# Patient Record
Sex: FEMALE | Race: BLACK OR AFRICAN AMERICAN | NOT HISPANIC OR LATINO | Employment: OTHER | URBAN - METROPOLITAN AREA
[De-identification: names, ages, dates, MRNs, and addresses within clinical notes are randomized per-mention and may not be internally consistent; named-entity substitution may affect disease eponyms.]

---

## 2017-05-17 ENCOUNTER — HISTORICAL (OUTPATIENT)
Dept: ADMINISTRATIVE | Facility: HOSPITAL | Age: 62
End: 2017-05-17

## 2017-05-17 LAB
ABS NEUT (OLG): 3.33 X10(3)/MCL (ref 2.1–9.2)
ALBUMIN SERPL-MCNC: 3.9 GM/DL (ref 3.4–5)
ALBUMIN/GLOB SERPL: 1 RATIO (ref 1–2)
ALP SERPL-CCNC: 83 UNIT/L (ref 20–120)
ALT SERPL-CCNC: 15 UNIT/L
AST SERPL-CCNC: 20 UNIT/L
BASOPHILS # BLD AUTO: 0.05 X10(3)/MCL
BASOPHILS NFR BLD AUTO: 1 % (ref 0–1)
BILIRUB SERPL-MCNC: 0.6 MG/DL
BILIRUBIN DIRECT+TOT PNL SERPL-MCNC: <0.1 MG/DL
BILIRUBIN DIRECT+TOT PNL SERPL-MCNC: >0.5 MG/DL
BUN SERPL-MCNC: 15 MG/DL (ref 7–25)
CALCIUM SERPL-MCNC: 9.4 MG/DL (ref 8.4–10.3)
CHLORIDE SERPL-SCNC: 102 MMOL/L (ref 96–110)
CHOLEST SERPL-MCNC: 157 MG/DL
CHOLEST/HDLC SERPL: 3.9 {RATIO} (ref 0–4.4)
CO2 SERPL-SCNC: 31 MMOL/L (ref 24–32)
CREAT SERPL-MCNC: 0.78 MG/DL (ref 0.7–1.1)
EOSINOPHIL # BLD AUTO: 0.09 10*3/UL
EOSINOPHIL NFR BLD AUTO: 1 % (ref 0–5)
ERYTHROCYTE [DISTWIDTH] IN BLOOD BY AUTOMATED COUNT: 13.5 % (ref 11.5–14.5)
EST. AVERAGE GLUCOSE BLD GHB EST-MCNC: 123 MG/DL
GLOBULIN SER-MCNC: 3.7 GM/ML (ref 2.3–3.5)
GLUCOSE SERPL-MCNC: 101 MG/DL (ref 65–99)
HBA1C MFR BLD: 5.9 % (ref 4.7–5.6)
HCT VFR BLD AUTO: 39.8 % (ref 35–46)
HDLC SERPL-MCNC: 40 MG/DL
HGB BLD-MCNC: 12.6 GM/DL (ref 12–16)
IMM GRANULOCYTES # BLD AUTO: 0.02 10*3/UL
IMM GRANULOCYTES NFR BLD AUTO: 0 %
LDLC SERPL CALC-MCNC: 96 MG/DL (ref 0–130)
LYMPHOCYTES # BLD AUTO: 2.63 X10(3)/MCL
LYMPHOCYTES NFR BLD AUTO: 39 % (ref 15–40)
MCH RBC QN AUTO: 28.4 PG (ref 26–34)
MCHC RBC AUTO-ENTMCNC: 31.7 GM/DL (ref 31–37)
MCV RBC AUTO: 89.8 FL (ref 80–100)
MONOCYTES # BLD AUTO: 0.56 X10(3)/MCL
MONOCYTES NFR BLD AUTO: 8 % (ref 4–12)
NEUTROPHILS # BLD AUTO: 3.33 X10(3)/MCL
NEUTROPHILS NFR BLD AUTO: 50 X10(3)/MCL
PLATELET # BLD AUTO: 293 X10(3)/MCL (ref 130–400)
PMV BLD AUTO: 9.9 FL (ref 7.4–10.4)
POTASSIUM SERPL-SCNC: 3.6 MMOL/L (ref 3.6–5.2)
PROT SERPL-MCNC: 7.6 GM/DL (ref 6–8)
RBC # BLD AUTO: 4.43 X10(6)/MCL (ref 4–5.2)
SODIUM SERPL-SCNC: 142 MMOL/L (ref 135–146)
TRIGL SERPL-MCNC: 105 MG/DL
TSH SERPL-ACNC: 1.73 MIU/L (ref 0.5–5)
VLDLC SERPL CALC-MCNC: 21 MG/DL
WBC # SPEC AUTO: 6.7 X10(3)/MCL (ref 4.5–11)

## 2017-12-18 ENCOUNTER — HISTORICAL (OUTPATIENT)
Dept: ADMINISTRATIVE | Facility: HOSPITAL | Age: 62
End: 2017-12-18

## 2018-01-25 ENCOUNTER — HISTORICAL (OUTPATIENT)
Dept: RADIOLOGY | Facility: HOSPITAL | Age: 63
End: 2018-01-25

## 2020-01-22 ENCOUNTER — HISTORICAL (OUTPATIENT)
Dept: RADIOLOGY | Facility: HOSPITAL | Age: 65
End: 2020-01-22

## 2021-04-27 ENCOUNTER — HISTORICAL (OUTPATIENT)
Dept: RADIOLOGY | Facility: HOSPITAL | Age: 66
End: 2021-04-27

## 2021-10-18 ENCOUNTER — HISTORICAL (OUTPATIENT)
Dept: INTERNAL MEDICINE | Facility: CLINIC | Age: 66
End: 2021-10-18

## 2021-10-18 LAB
ABS NEUT (OLG): 3.91 X10(3)/MCL (ref 2.1–9.2)
ALBUMIN SERPL-MCNC: 3.6 GM/DL (ref 3.4–4.8)
ALBUMIN/GLOB SERPL: 1 RATIO (ref 1.1–2)
ALP SERPL-CCNC: 105 UNIT/L (ref 40–150)
ALT SERPL-CCNC: 10 UNIT/L (ref 0–55)
AST SERPL-CCNC: 12 UNIT/L (ref 5–34)
BASOPHILS # BLD AUTO: 0.1 X10(3)/MCL (ref 0–0.2)
BASOPHILS NFR BLD AUTO: 1 %
BILIRUB SERPL-MCNC: 0.4 MG/DL
BILIRUBIN DIRECT+TOT PNL SERPL-MCNC: 0.1 MG/DL (ref 0–0.5)
BILIRUBIN DIRECT+TOT PNL SERPL-MCNC: 0.3 MG/DL (ref 0–0.8)
BUN SERPL-MCNC: 16.4 MG/DL (ref 9.8–20.1)
CALCIUM SERPL-MCNC: 10 MG/DL (ref 8.4–10.2)
CHLORIDE SERPL-SCNC: 110 MMOL/L (ref 98–107)
CHOLEST SERPL-MCNC: 236 MG/DL
CHOLEST/HDLC SERPL: 5 {RATIO} (ref 0–5)
CO2 SERPL-SCNC: 24 MMOL/L (ref 23–31)
CREAT SERPL-MCNC: 0.78 MG/DL (ref 0.55–1.02)
EOSINOPHIL # BLD AUTO: 0.1 X10(3)/MCL (ref 0–0.9)
EOSINOPHIL NFR BLD AUTO: 2 %
ERYTHROCYTE [DISTWIDTH] IN BLOOD BY AUTOMATED COUNT: 13.7 % (ref 11.5–14.5)
EST. AVERAGE GLUCOSE BLD GHB EST-MCNC: 116.9 MG/DL
GLOBULIN SER-MCNC: 3.7 GM/DL (ref 2.4–3.5)
GLUCOSE SERPL-MCNC: 97 MG/DL (ref 82–115)
HBA1C MFR BLD: 5.7 %
HCT VFR BLD AUTO: 40.3 % (ref 35–46)
HDLC SERPL-MCNC: 51 MG/DL (ref 35–60)
HGB BLD-MCNC: 12.7 GM/DL (ref 12–16)
IMM GRANULOCYTES # BLD AUTO: 0.01 10*3/UL
IMM GRANULOCYTES NFR BLD AUTO: 0 %
LDLC SERPL CALC-MCNC: 169 MG/DL (ref 50–140)
LYMPHOCYTES # BLD AUTO: 2.1 X10(3)/MCL (ref 0.6–4.6)
LYMPHOCYTES NFR BLD AUTO: 31 %
MCH RBC QN AUTO: 29.1 PG (ref 26–34)
MCHC RBC AUTO-ENTMCNC: 31.5 GM/DL (ref 31–37)
MCV RBC AUTO: 92.2 FL (ref 80–100)
MONOCYTES # BLD AUTO: 0.6 X10(3)/MCL (ref 0.1–1.3)
MONOCYTES NFR BLD AUTO: 9 %
NEUTROPHILS # BLD AUTO: 3.91 X10(3)/MCL (ref 2.1–9.2)
NEUTROPHILS NFR BLD AUTO: 57 %
NRBC BLD AUTO-RTO: 0 % (ref 0–0.2)
PLATELET # BLD AUTO: 283 X10(3)/MCL (ref 130–400)
PMV BLD AUTO: 9.8 FL (ref 7.4–10.4)
POTASSIUM SERPL-SCNC: 3.7 MMOL/L (ref 3.5–5.1)
PROT SERPL-MCNC: 7.3 GM/DL (ref 5.8–7.6)
RBC # BLD AUTO: 4.37 X10(6)/MCL (ref 4–5.2)
SODIUM SERPL-SCNC: 143 MMOL/L (ref 136–145)
T4 FREE SERPL-MCNC: 0.92 NG/DL (ref 0.7–1.48)
TRIGL SERPL-MCNC: 81 MG/DL (ref 37–140)
TSH SERPL-ACNC: 3.16 UIU/ML (ref 0.35–4.94)
VLDLC SERPL CALC-MCNC: 16 MG/DL
WBC # SPEC AUTO: 6.9 X10(3)/MCL (ref 4.5–11)

## 2022-04-10 ENCOUNTER — HISTORICAL (OUTPATIENT)
Dept: ADMINISTRATIVE | Facility: HOSPITAL | Age: 67
End: 2022-04-10
Payer: MEDICARE

## 2022-04-22 DIAGNOSIS — Z12.31 ENCOUNTER FOR SCREENING MAMMOGRAM FOR MALIGNANT NEOPLASM OF BREAST: ICD-10-CM

## 2022-04-22 DIAGNOSIS — Z12.39 BREAST CANCER SCREENING: Primary | ICD-10-CM

## 2022-04-29 VITALS
SYSTOLIC BLOOD PRESSURE: 156 MMHG | OXYGEN SATURATION: 100 % | DIASTOLIC BLOOD PRESSURE: 89 MMHG | HEIGHT: 60 IN | WEIGHT: 212.75 LBS | BODY MASS INDEX: 41.77 KG/M2

## 2022-05-02 ENCOUNTER — HOSPITAL ENCOUNTER (EMERGENCY)
Facility: HOSPITAL | Age: 67
Discharge: HOME | End: 2022-05-02
Attending: EMERGENCY MEDICINE
Payer: MEDICARE

## 2022-05-02 VITALS
TEMPERATURE: 98 F | SYSTOLIC BLOOD PRESSURE: 203 MMHG | OXYGEN SATURATION: 97 % | WEIGHT: 214.5 LBS | HEART RATE: 61 BPM | RESPIRATION RATE: 16 BRPM | BODY MASS INDEX: 36.62 KG/M2 | HEIGHT: 64 IN | DIASTOLIC BLOOD PRESSURE: 97 MMHG

## 2022-05-02 DIAGNOSIS — R42 VERTIGO: ICD-10-CM

## 2022-05-02 DIAGNOSIS — B37.31 VAGINAL YEAST INFECTION: ICD-10-CM

## 2022-05-02 DIAGNOSIS — R06.02 SOB (SHORTNESS OF BREATH): ICD-10-CM

## 2022-05-02 DIAGNOSIS — B00.9 HERPES: Primary | ICD-10-CM

## 2022-05-02 DIAGNOSIS — R07.9 CHEST PAIN: ICD-10-CM

## 2022-05-02 LAB
ALBUMIN SERPL-MCNC: 3.7 GM/DL (ref 3.4–4.8)
ALBUMIN/GLOB SERPL: 1 RATIO (ref 1.1–2)
ALP SERPL-CCNC: 100 UNIT/L (ref 40–150)
ALT SERPL-CCNC: 11 UNIT/L (ref 0–55)
APPEARANCE UR: CLEAR
AST SERPL-CCNC: 15 UNIT/L (ref 5–34)
BACTERIA #/AREA URNS AUTO: ABNORMAL /HPF
BASOPHILS # BLD AUTO: 0.06 X10(3)/MCL (ref 0–0.2)
BASOPHILS NFR BLD AUTO: 1 %
BILIRUB UR QL STRIP.AUTO: NEGATIVE MG/DL
BILIRUBIN DIRECT+TOT PNL SERPL-MCNC: 0.2 MG/DL (ref 0–0.5)
BILIRUBIN DIRECT+TOT PNL SERPL-MCNC: 0.2 MG/DL (ref 0–0.8)
BILIRUBIN DIRECT+TOT PNL SERPL-MCNC: 0.4 MG/DL
BNP BLD-MCNC: 63.7 PG/ML
BUN SERPL-MCNC: 8.7 MG/DL (ref 9.8–20.1)
CALCIUM SERPL-MCNC: 9.9 MG/DL (ref 8.4–10.2)
CHLORIDE SERPL-SCNC: 107 MMOL/L (ref 98–107)
CLUE CELLS VAG QL WET PREP: NORMAL
CO2 SERPL-SCNC: 27 MMOL/L (ref 23–31)
COLOR UR AUTO: ABNORMAL
CREAT SERPL-MCNC: 0.79 MG/DL (ref 0.55–1.02)
D DIMER PPP IA.FEU-MCNC: 1.11 UG/ML FEU (ref 0–0.5)
EOSINOPHIL # BLD AUTO: 0.11 X10(3)/MCL (ref 0–0.9)
EOSINOPHIL NFR BLD AUTO: 1.8 %
ERYTHROCYTE [DISTWIDTH] IN BLOOD BY AUTOMATED COUNT: 13.8 % (ref 11.5–17)
FLUAV AG UPPER RESP QL IA.RAPID: NOT DETECTED
FLUBV AG UPPER RESP QL IA.RAPID: NOT DETECTED
GLOBULIN SER-MCNC: 3.6 GM/DL (ref 2.4–3.5)
GLUCOSE SERPL-MCNC: 86 MG/DL (ref 82–115)
GLUCOSE UR QL STRIP.AUTO: NORMAL MG/DL
HCT VFR BLD AUTO: 41.5 % (ref 37–47)
HGB BLD-MCNC: 13 GM/DL (ref 12–16)
HYALINE CASTS #/AREA URNS LPF: ABNORMAL /LPF
IMM GRANULOCYTES # BLD AUTO: 0.02 X10(3)/MCL (ref 0–0.02)
IMM GRANULOCYTES NFR BLD AUTO: 0.3 % (ref 0–0.43)
KETONES UR QL STRIP.AUTO: NEGATIVE MG/DL
LEUKOCYTE ESTERASE UR QL STRIP.AUTO: NEGATIVE UNIT/L
LYMPHOCYTES # BLD AUTO: 2.12 X10(3)/MCL (ref 0.6–4.6)
LYMPHOCYTES NFR BLD AUTO: 34.2 %
MCH RBC QN AUTO: 28.9 PG (ref 27–31)
MCHC RBC AUTO-ENTMCNC: 31.3 MG/DL (ref 33–36)
MCV RBC AUTO: 92.2 FL (ref 80–94)
MONOCYTES # BLD AUTO: 0.61 X10(3)/MCL (ref 0.1–1.3)
MONOCYTES NFR BLD AUTO: 9.8 %
MUCOUS THREADS URNS QL MICRO: ABNORMAL /LPF
NEUTROPHILS # BLD AUTO: 3.3 X10(3)/MCL (ref 2.1–9.2)
NEUTROPHILS NFR BLD AUTO: 52.9 %
NITRITE UR QL STRIP.AUTO: NEGATIVE
NRBC BLD AUTO-RTO: 0 %
PH UR STRIP.AUTO: 5.5 [PH]
PLATELET # BLD AUTO: 304 X10(3)/MCL (ref 130–400)
PMV BLD AUTO: 10.6 FL (ref 9.4–12.4)
POTASSIUM SERPL-SCNC: 3.7 MMOL/L (ref 3.5–5.1)
PROT SERPL-MCNC: 7.3 GM/DL (ref 5.8–7.6)
PROT UR QL STRIP.AUTO: NEGATIVE MG/DL
RBC # BLD AUTO: 4.5 X10(6)/MCL (ref 4.2–5.4)
RBC UR QL AUTO: NEGATIVE UNIT/L
SARS-COV-2 RNA RESP QL NAA+PROBE: NOT DETECTED
SODIUM SERPL-SCNC: 141 MMOL/L (ref 136–145)
SP GR UR STRIP.AUTO: 1.01
SQUAMOUS #/AREA URNS LPF: ABNORMAL /HPF
T VAGINALIS VAG QL WET PREP: NORMAL
TROPONIN I SERPL-MCNC: <0.01 NG/ML (ref 0–0.04)
UROBILINOGEN UR STRIP-ACNC: 0.2 MG/DL
WBC # SPEC AUTO: 6.2 X10(3)/MCL (ref 4.5–11.5)
WBC #/AREA URNS AUTO: ABNORMAL /HPF
WBC #/AREA VAG WET PREP: NORMAL
YEAST SPEC QL WET PREP: NORMAL

## 2022-05-02 PROCEDURE — 93005 ELECTROCARDIOGRAM TRACING: CPT

## 2022-05-02 PROCEDURE — 80053 COMPREHEN METABOLIC PANEL: CPT | Performed by: PHYSICIAN ASSISTANT

## 2022-05-02 PROCEDURE — 63600175 PHARM REV CODE 636 W HCPCS: Performed by: PHYSICIAN ASSISTANT

## 2022-05-02 PROCEDURE — 85379 FIBRIN DEGRADATION QUANT: CPT | Performed by: PHYSICIAN ASSISTANT

## 2022-05-02 PROCEDURE — 99285 EMERGENCY DEPT VISIT HI MDM: CPT | Mod: 25

## 2022-05-02 PROCEDURE — 84484 ASSAY OF TROPONIN QUANT: CPT | Performed by: PHYSICIAN ASSISTANT

## 2022-05-02 PROCEDURE — 96374 THER/PROPH/DIAG INJ IV PUSH: CPT | Mod: 59

## 2022-05-02 PROCEDURE — 87210 SMEAR WET MOUNT SALINE/INK: CPT | Performed by: PHYSICIAN ASSISTANT

## 2022-05-02 PROCEDURE — 87636 SARSCOV2 & INF A&B AMP PRB: CPT | Performed by: PHYSICIAN ASSISTANT

## 2022-05-02 PROCEDURE — 81001 URINALYSIS AUTO W/SCOPE: CPT | Performed by: PHYSICIAN ASSISTANT

## 2022-05-02 PROCEDURE — 36415 COLL VENOUS BLD VENIPUNCTURE: CPT | Performed by: PHYSICIAN ASSISTANT

## 2022-05-02 PROCEDURE — 25500020 PHARM REV CODE 255: Performed by: PHYSICIAN ASSISTANT

## 2022-05-02 PROCEDURE — 25000003 PHARM REV CODE 250: Performed by: PHYSICIAN ASSISTANT

## 2022-05-02 PROCEDURE — 83880 ASSAY OF NATRIURETIC PEPTIDE: CPT | Performed by: PHYSICIAN ASSISTANT

## 2022-05-02 PROCEDURE — 85025 COMPLETE CBC W/AUTO DIFF WBC: CPT | Performed by: PHYSICIAN ASSISTANT

## 2022-05-02 RX ORDER — MECLIZINE HYDROCHLORIDE 25 MG/1
25 TABLET ORAL
Status: COMPLETED | OUTPATIENT
Start: 2022-05-02 | End: 2022-05-02

## 2022-05-02 RX ORDER — VALACYCLOVIR HYDROCHLORIDE 1 G/1
1000 TABLET, FILM COATED ORAL DAILY
Qty: 5 TABLET | Refills: 0 | Status: SHIPPED | OUTPATIENT
Start: 2022-05-02 | End: 2023-10-12

## 2022-05-02 RX ORDER — SIMVASTATIN 10 MG/1
10 TABLET, FILM COATED ORAL NIGHTLY
COMMUNITY
Start: 2022-04-07 | End: 2022-08-22 | Stop reason: SDUPTHER

## 2022-05-02 RX ORDER — FLUCONAZOLE 200 MG/1
200 TABLET ORAL ONCE
Qty: 1 TABLET | Refills: 0 | Status: SHIPPED | OUTPATIENT
Start: 2022-05-02 | End: 2022-05-02

## 2022-05-02 RX ORDER — DIPHENHYDRAMINE HYDROCHLORIDE 50 MG/ML
50 INJECTION INTRAMUSCULAR; INTRAVENOUS ONCE
Status: COMPLETED | OUTPATIENT
Start: 2022-05-02 | End: 2022-05-02

## 2022-05-02 RX ORDER — LOSARTAN POTASSIUM 100 MG/1
100 TABLET ORAL
COMMUNITY
Start: 2021-09-10 | End: 2022-08-22 | Stop reason: SDUPTHER

## 2022-05-02 RX ORDER — METOPROLOL SUCCINATE 25 MG/1
25 TABLET, EXTENDED RELEASE ORAL
COMMUNITY
Start: 2021-10-18 | End: 2022-08-22 | Stop reason: SDUPTHER

## 2022-05-02 RX ORDER — MECLIZINE HYDROCHLORIDE 25 MG/1
25 TABLET ORAL 3 TIMES DAILY PRN
Qty: 30 TABLET | Refills: 0 | Status: SHIPPED | OUTPATIENT
Start: 2022-05-02 | End: 2022-08-22

## 2022-05-02 RX ADMIN — MECLIZINE HYDROCHLORIDE 25 MG: 25 TABLET ORAL at 11:05

## 2022-05-02 RX ADMIN — DIPHENHYDRAMINE HYDROCHLORIDE 50 MG: 50 INJECTION INTRAMUSCULAR; INTRAVENOUS at 12:05

## 2022-05-02 RX ADMIN — IOPAMIDOL 100 ML: 755 INJECTION, SOLUTION INTRAVENOUS at 01:05

## 2022-05-02 NOTE — ED PROVIDER NOTES
Encounter Date: 5/2/2022       History     Chief Complaint   Patient presents with    Weakness    Dizziness     Pt in with complaints of weakness and dizziness x 1 week.  Pt also complaining of a vaginal rash x 1 week.     Patient is a  66 year old female who presents to ED with weakness and occasional episodes of dizziness x 1 week.  She states in the past, she does occasionally get dizzy but was prescribed a medication that helps however she is out of med.   She states she experiences dizziness when she is moving around a lot but relaxing helps this to resolve this.  She also claims to have a painful and itchy rash to vaginal area that she has experienced in the past.  She denies headache, nausea, vomiting, loc, chest pain, SOB, vision changes.      The history is provided by the patient.   Illness   Illness onset: 1 week. The problem occurs occasionally. The symptoms are relieved by rest. The symptoms are aggravated by activity and movement. Pertinent negatives include no fever, no decreased vision, no double vision, no photophobia, no abdominal pain, no constipation, no diarrhea, no nausea, no vomiting, no congestion, no ear pain, no headaches, no hearing loss, no mouth sores, no rhinorrhea, no sore throat, no muscle aches, no neck pain, no cough, no shortness of breath, no discharge, no pain and no eye redness.     Review of patient's allergies indicates:   Allergen Reactions    Opioids - morphine analogues      Pt informed post surgery by MD that she is allergic to morphine    Iodine and iodide containing products Rash     No past medical history on file.  No past surgical history on file.  No family history on file.     Review of Systems   Constitutional: Negative for chills and fever.   HENT: Negative for congestion, ear pain, hearing loss, mouth sores, rhinorrhea and sore throat.    Eyes: Negative for double vision, photophobia, pain, discharge and redness.   Respiratory: Negative for cough and  shortness of breath.    Gastrointestinal: Negative for abdominal pain, constipation, diarrhea, nausea and vomiting.   Genitourinary: Negative for dysuria.        Vaginal rash, painful, itchy   Musculoskeletal: Negative for neck pain.   Neurological: Positive for dizziness. Negative for light-headedness and headaches.   Hematological: Negative.        Physical Exam     Initial Vitals [05/02/22 0924]   BP Pulse Resp Temp SpO2   (!) 169/85 63 16 98.6 °F (37 °C) (!) 94 %      MAP       --         Physical Exam    Nursing note and vitals reviewed.  Constitutional: She appears well-developed and well-nourished.   HENT:   Nose: Nose normal.   Eyes: Conjunctivae are normal.   Neck: Neck supple.   Normal range of motion.  Cardiovascular: Normal rate.   Pulmonary/Chest: Breath sounds normal.   Abdominal: Abdomen is soft. Bowel sounds are normal. There is no abdominal tenderness. There is no rebound and no guarding.   Genitourinary:    Pelvic exam was performed with patient supine.   There is rash (consitent with herpes) on the left labia.    Vaginal discharge (white, thick) present.     Musculoskeletal:         General: Normal range of motion.      Cervical back: Normal range of motion and neck supple.     Neurological: She is alert and oriented to person, place, and time. She has normal strength. No cranial nerve deficit. GCS score is 15. GCS eye subscore is 4. GCS verbal subscore is 5. GCS motor subscore is 6.   Skin: Skin is warm. Capillary refill takes less than 2 seconds.         ED Course   Procedures  Labs Reviewed   URINALYSIS, REFLEX TO URINE CULTURE - Abnormal; Notable for the following components:       Result Value    Color, UA Light-Yellow (*)     Bacteria, UA Trace (*)     Squamous Epithelial Cells, UA Occ (*)     Mucous, UA Trace (*)     All other components within normal limits    Narrative:     RBC 0-5 REF RANGE: <6-10   COMPREHENSIVE METABOLIC PANEL - Abnormal; Notable for the following components:    Blood  Urea Nitrogen 8.7 (*)     Globulin 3.6 (*)     Albumin/Globulin Ratio 1.0 (*)     All other components within normal limits   D DIMER, QUANTITATIVE - Abnormal; Notable for the following components:    D-Dimer 1.11 (*)     All other components within normal limits   CBC WITH DIFFERENTIAL - Abnormal; Notable for the following components:    MCHC 31.3 (*)     IG# 0.02 (*)     All other components within normal limits   WET PREP, GENITAL - Normal   TROPONIN I - Normal   B-TYPE NATRIURETIC PEPTIDE - Normal   COVID/FLU A&B PCR - Normal   CBC W/ AUTO DIFFERENTIAL    Narrative:     The following orders were created for panel order CBC auto differential.  Procedure                               Abnormality         Status                     ---------                               -----------         ------                     CBC with Differential[[id number]]        Abnormal            Final result                 Please view results for these tests on the individual orders.   EXTRA TUBES    Narrative:     The following orders were created for panel order EXTRA TUBES.  Procedure                               Abnormality         Status                     ---------                               -----------         ------                     Gold Top Hold[[id number]]                                    In process                 Pink Top Hold[[id number]]                                    In process                   Please view results for these tests on the individual orders.   GOLD TOP HOLD   PINK TOP HOLD     EKG Readings: (Independently Interpreted)   Initial: Time 8:31. Rhythm: Normal Sinus Rhythm. Heart Rate: 63. Ectopy: No Ectopy.   Minimal voltage criteria for LVH     ECG Results          EKG 12-lead (In process)  Result time 05/03/22 22:38:22    In process by Interface, Lab In [facility] (05/03/22 22:38:22)                 Narrative:    Test Reason : R07.9,    Vent. Rate : 063 BPM     Atrial Rate : 063 BPM     P-R Int : 154  ms          QRS Dur : 086 ms      QT Int : 424 ms       P-R-T Axes : 042 005 043 degrees     QTc Int : 433 ms    Normal sinus rhythm  Minimal voltage criteria for LVH, may be normal variant  Borderline Abnormal ECG  No previous ECGs available    Referred By: [email or web address]   SELF           Confirmed By:                   In process by Interface, Lab In [facility] (05/03/22 07:37:47)                 Narrative:    Test Reason : R07.9,    Vent. Rate : 063 BPM     Atrial Rate : 063 BPM     P-R Int : 154 ms          QRS Dur : 086 ms      QT Int : 424 ms       P-R-T Axes : 042 005 043 degrees     QTc Int : 433 ms    Normal sinus rhythm  Minimal voltage criteria for LVH, may be normal variant  Borderline Abnormal ECG  No previous ECGs available    Referred By: [email or web address]   SELF           Confirmed By:                   In process by Interface, Lab In [facility] (05/02/22 15:35:19)                 Narrative:    Test Reason : EKG    Vent. Rate : 063 BPM     Atrial Rate : 063 BPM     P-R Int : 154 ms          QRS Dur : 086 ms      QT Int : 424 ms       P-R-T Axes : 042 005 043 degrees     QTc Int : 433 ms    Normal sinus rhythm  Minimal voltage criteria for LVH, may be normal variant  Borderline Abnormal ECG  No previous ECGs available    Referred By: [email or web address]   SELF           Confirmed By:                             Imaging Results          CTA Chest Non-Coronary (PE Study) (Final result)  Result time 05/02/22 14:01:14    Final result by Irving Dorantes MD (05/02/22 14:01:14)                 Impression:      Negative for pulmonary thromboembolic disease.  No acute findings in the chest.  Chronic findings above.      Electronically signed by: Irving Dorantes  Date:    05/02/2022  Time:    14:01             Narrative:    EXAMINATION:  CTA CHEST NON CORONARY    CLINICAL HISTORY:  Pulmonary embolism (PE) suspected, positive D-dimer;    TECHNIQUE:  Helical acquisition through the chest with IV contrast targeting the pulmonary arteries.  Multiplanar and 3D MIP reconstructed images were provided for review.  mGycm. Automatic exposure control, adjustment of mA/kV or iterative reconstruction technique was used to reduce radiation.    COMPARISON:  None available.    FINDINGS:  There is good opacification of the pulmonary arterial tree. There is no evidence of pulmonary thromboembolism.  There is no aortic dissection.    There is no mediastinal, hilar or axillary lymphadenopathy by size criteria.    Heart is not significantly enlarged.  No pericardial effusion.    There is no pleural effusion.  Mild atelectasis or scarring towards the lung bases.  No consolidation suspicious for pneumonia.    Small hiatal hernia.  Normal adrenals.  Moderate degenerative change of the spine.                               X-Ray Chest PA And Lateral (Final result)  Result time 05/02/22 12:42:59    Final result by Irving Dorantes MD (05/02/22 12:42:59)                 Impression:      No acute cardiopulmonary abnormality.      Electronically signed by: Irving Dorantes  Date:    05/02/2022  Time:    12:42             Narrative:    EXAMINATION:  XR CHEST PA AND LATERAL    CLINICAL HISTORY:  Shortness of breath    COMPARISON:  4 November 2021    FINDINGS:  PA and lateral views of the chest were obtained. The heart is not significantly enlarged.  The lungs are clear.  No pneumothorax or significant effusion.  There are degenerative changes thoracic spine.                                 Medications   meclizine tablet 25 mg (25 mg Oral Given 5/2/22 1122)   diphenhydrAMINE injection 50 mg (50 mg Intravenous Given 5/2/22 1244)   iopamidoL (ISOVUE-370) injection 100 mL (100 mLs Intravenous Given 5/2/22 1349)     Medical Decision Making:   Clinical Tests:   Lab Tests: Ordered and Reviewed  Radiological Study: Ordered and Reviewed             ED Course as of 05/04/22 0918   Mon May 02, 2022   1147 Troponin I: <0.010 [ER]   1147 D. farinae(!): 1.11  Will order Ct Angio chest to  rule out PE [ER]   1411 CTA Chest Non-Coronary (PE Study)  Negative for pulmonary thromboembolic disease.  No acute findings in the chest.  Chronic findings above. [ER]   1411 X-Ray Chest PA And Lateral [ER]   Wed May 04, 2022   0917 BP(!): 203/97  Patient did not take BP meds this morning.   [ER]      ED Course User Index  [ER] SRINI Pérez             Clinical Impression:   Final diagnoses:  [R07.9] Chest pain  [R06.02] SOB (shortness of breath)  [B00.9] Herpes (Primary)  [R42] Vertigo  [B37.3] Vaginal yeast infection          ED Disposition Condition    Discharge Stable        ED Prescriptions     Medication Sig Dispense Start Date End Date Auth. Provider    meclizine (ANTIVERT) 25 mg tablet Take 1 tablet (25 mg total) by mouth 3 (three) times daily as needed for Dizziness. 30 tablet 2022  SRINI Pérez    valACYclovir (VALTREX) 1000 MG tablet Take 1 tablet (1,000 mg total) by mouth once daily. for 5 days 5 tablet 2022 SRINI Pérez    fluconazole (DIFLUCAN) 200 MG Tab () Take 1 tablet (200 mg total) by mouth once. for 1 dose 1 tablet 2022 SRINI Pérez        Follow-up Information     Follow up With Specialties Details Why Contact Info    [facility] Emergency Medicine  As needed, If symptoms worsen [address] [facility] [address] [phone]           SRINI Pérez  22 0913

## 2022-05-02 NOTE — HISTORICAL [facility] CERNER
This is a historical note converted from Nva. Formatting and pictures may have been removed.  Please reference Nav for original formatting and attached multimedia. Chief Complaint  check up medication refills  History of Present Illness  62 yo bf with htn chol  Review of Systems  ros as documented in hpi  Physical Exam  Vitals & Measurements  T:?36.5? ?C ?(Oral)? HR:?61?(Peripheral)? RR:?16? BP:?117/79? HT:?150?cm? HT:?150?cm? WT:?94.7?kg? WT:?94.7?kg? BMI:?42.09?  aax4 nad  je eomi  cv rrr s1s2 no mgr  lungs cta no cr or whz  abd nt soft  ext no edema  neuro intact  Assessment/Plan  1.?HLD (hyperlipidemia)  ?flp today  Ordered:  CBC w/ Auto Diff, Routine collect, *Est. 05/17/17 3:00:00 CDT, Blood, Order for future visit, *Est. Stop date 05/17/17 3:00:00 CDT, Lab Collect, HLD (hyperlipidemia)  HTN (hypertension), benign  Obesity, On Exactly, 05/17/17 9:07:00 CDT  Clinic Follow up, *Est. 11/17/17 3:00:00 CST, Order for future visit, HLD (hyperlipidemia)  HTN (hypertension), benign  Obesity, [facility]  Comprehensive Metabolic Panel, Routine collect, *Est. 05/17/17 3:00:00 CDT, Blood, Order for future visit, *Est. Stop date 05/17/17 3:00:00 CDT, Lab Collect, HLD (hyperlipidemia)  HTN (hypertension), benign  Obesity, On Exactly, 05/17/17 9:07:00 CDT  Hemoglobin A1C [facility], Routine collect, *Est. 05/17/17 3:00:00 CDT, Blood, Order for future visit, *Est. Stop date 05/17/17 3:00:00 CDT, Lab Collect, HLD (hyperlipidemia)  HTN (hypertension), benign  Obesity, On Exactly, 05/17/17 9:07:00 CDT  Lipid Panel, Routine collect, *Est. 05/17/17 3:00:00 CDT, Blood, Order for future visit, *Est. Stop date 05/17/17 3:00:00 CDT, Lab Collect, HLD (hyperlipidemia)  HTN (hypertension), benign  Obesity, On Exactly, 05/17/17 9:07:00 CDT  Occult Blood Stool #2 Screening, Routine collect, *Est. 05/17/17 3:00:00 CDT, Stool, Order for future visit, *Est. Stop date 05/17/17 3:00:00 CDT, Nurse collect, HLD (hyperlipidemia)   HTN (hypertension), benign  Obesity, On Exactly, 05/17/17 9:07:00 CDT  Occult Blood Stool #3 Screening, Routine collect, *Est. 05/17/17 3:00:00 CDT, Stool, Order for future visit, *Est. Stop date 05/17/17 3:00:00 CDT, Nurse collect, HLD (hyperlipidemia)  HTN (hypertension), benign  Obesity, On Exactly, 05/17/17 9:07:00 CDT  Occult Blood Stool Screening Test, Routine collect, *Est. 05/17/17 3:00:00 CDT, Stool, Order for future visit, *Est. Stop date 05/17/17 3:00:00 CDT, Nurse collect, HLD (hyperlipidemia)  HTN (hypertension), benign  Obesity, On Exactly, 05/17/17 9:07:00 CDT  Office/Outpatient Visit Level 4 Established [id number] PC, HLD (hyperlipidemia)  HTN (hypertension), benign  Obesity, [facility], 05/17/17 9:07:00 CDT  Thyroid Stimulating Hormone, Routine collect, *Est. 05/17/17 3:00:00 CDT, Blood, Order for future visit, *Est. Stop date 05/17/17 3:00:00 CDT, Lab Collect, HLD (hyperlipidemia)  HTN (hypertension), benign  Obesity, On Exactly, 05/17/17 9:07:00 CDT  ?  2.?HTN (hypertension), benign  ?controlled  Ordered:  CBC w/ Auto Diff, Routine collect, *Est. 05/17/17 3:00:00 CDT, Blood, Order for future visit, *Est. Stop date 05/17/17 3:00:00 CDT, Lab Collect, HLD (hyperlipidemia)  HTN (hypertension), benign  Obesity, On Exactly, 05/17/17 9:07:00 CDT  Clinic Follow up, *Est. 11/17/17 3:00:00 CST, Order for future visit, HLD (hyperlipidemia)  HTN (hypertension), benign  Obesity, [facility]  Comprehensive Metabolic Panel, Routine collect, *Est. 05/17/17 3:00:00 CDT, Blood, Order for future visit, *Est. Stop date 05/17/17 3:00:00 CDT, Lab Collect, HLD (hyperlipidemia)  HTN (hypertension), benign  Obesity, On Exactly, 05/17/17 9:07:00 CDT  Hemoglobin A1C [facility], Routine collect, *Est. 05/17/17 3:00:00 CDT, Blood, Order for future visit, *Est. Stop date 05/17/17 3:00:00 CDT, Lab Collect, HLD (hyperlipidemia)  HTN (hypertension), benign  Obesity, On Exactly, 05/17/17 9:07:00 CDT  Lipid Panel, Routine  collect, *Est. 05/17/17 3:00:00 CDT, Blood, Order for future visit, *Est. Stop date 05/17/17 3:00:00 CDT, Lab Collect, HLD (hyperlipidemia)  HTN (hypertension), benign  Obesity, On Exactly, 05/17/17 9:07:00 CDT  Occult Blood Stool #2 Screening, Routine collect, *Est. 05/17/17 3:00:00 CDT, Stool, Order for future visit, *Est. Stop date 05/17/17 3:00:00 CDT, Nurse collect, HLD (hyperlipidemia)  HTN (hypertension), benign  Obesity, On Exactly, 05/17/17 9:07:00 CDT  Occult Blood Stool #3 Screening, Routine collect, *Est. 05/17/17 3:00:00 CDT, Stool, Order for future visit, *Est. Stop date 05/17/17 3:00:00 CDT, Nurse collect, HLD (hyperlipidemia)  HTN (hypertension), benign  Obesity, On Exactly, 05/17/17 9:07:00 CDT  Occult Blood Stool Screening Test, Routine collect, *Est. 05/17/17 3:00:00 CDT, Stool, Order for future visit, *Est. Stop date 05/17/17 3:00:00 CDT, Nurse collect, HLD (hyperlipidemia)  HTN (hypertension), benign  Obesity, On Exactly, 05/17/17 9:07:00 CDT  Office/Outpatient Visit Level 4 Established [id number] PC, HLD (hyperlipidemia)  HTN (hypertension), benign  Obesity, [facility], 05/17/17 9:07:00 CDT  Thyroid Stimulating Hormone, Routine collect, *Est. 05/17/17 3:00:00 CDT, Blood, Order for future visit, *Est. Stop date 05/17/17 3:00:00 CDT, Lab Collect, HLD (hyperlipidemia)  HTN (hypertension), benign  Obesity, On Exactly, 05/17/17 9:07:00 CDT  ?  3.?Obesity  ?dash  Ordered:  CBC w/ Auto Diff, Routine collect, *Est. 05/17/17 3:00:00 CDT, Blood, Order for future visit, *Est. Stop date 05/17/17 3:00:00 CDT, Lab Collect, HLD (hyperlipidemia)  HTN (hypertension), benign  Obesity, On Exactly, 05/17/17 9:07:00 CDT  Clinic Follow up, *Est. 11/17/17 3:00:00 CST, Order for future visit, HLD (hyperlipidemia)  HTN (hypertension), benign  Obesity, [facility]  Comprehensive Metabolic Panel, Routine collect, *Est. 05/17/17 3:00:00 CDT, Blood, Order for future visit, *Est. Stop date 05/17/17 3:00:00  CDT, Lab Collect, HLD (hyperlipidemia)  HTN (hypertension), benign  Obesity, On Exactly, 05/17/17 9:07:00 CDT  Hemoglobin A1C [facility], Routine collect, *Est. 05/17/17 3:00:00 CDT, Blood, Order for future visit, *Est. Stop date 05/17/17 3:00:00 CDT, Lab Collect, HLD (hyperlipidemia)  HTN (hypertension), benign  Obesity, On Exactly, 05/17/17 9:07:00 CDT  Lipid Panel, Routine collect, *Est. 05/17/17 3:00:00 CDT, Blood, Order for future visit, *Est. Stop date 05/17/17 3:00:00 CDT, Lab Collect, HLD (hyperlipidemia)  HTN (hypertension), benign  Obesity, On Exactly, 05/17/17 9:07:00 CDT  Occult Blood Stool #2 Screening, Routine collect, *Est. 05/17/17 3:00:00 CDT, Stool, Order for future visit, *Est. Stop date 05/17/17 3:00:00 CDT, Nurse collect, HLD (hyperlipidemia)  HTN (hypertension), benign  Obesity, On Exactly, 05/17/17 9:07:00 CDT  Occult Blood Stool #3 Screening, Routine collect, *Est. 05/17/17 3:00:00 CDT, Stool, Order for future visit, *Est. Stop date 05/17/17 3:00:00 CDT, Nurse collect, HLD (hyperlipidemia)  HTN (hypertension), benign  Obesity, On Exactly, 05/17/17 9:07:00 CDT  Occult Blood Stool Screening Test, Routine collect, *Est. 05/17/17 3:00:00 CDT, Stool, Order for future visit, *Est. Stop date 05/17/17 3:00:00 CDT, Nurse collect, HLD (hyperlipidemia)  HTN (hypertension), benign  Obesity, On Exactly, 05/17/17 9:07:00 CDT  Office/Outpatient Visit Level 4 Established [id number] PC, HLD (hyperlipidemia)  HTN (hypertension), benign  Obesity, [facility], 05/17/17 9:07:00 CDT  Thyroid Stimulating Hormone, Routine collect, *Est. 05/17/17 3:00:00 CDT, Blood, Order for future visit, *Est. Stop date 05/17/17 3:00:00 CDT, Lab Collect, HLD (hyperlipidemia)  HTN (hypertension), benign  Obesity, On Exactly, 05/17/17 9:07:00 CDT  ?   Problem List/Past Medical History  Allergic rhinitis  Chronic sinusitis  HLD (hyperlipidemia)  HTN (hypertension), benign  Hypertension  Obesity  Historical  Bladder  dysfunction  Procedure/Surgical History  Biopsy Breast With Localization (Left) (02/22/2016), Excision of cyst, fibroadenoma, or other benign or malignant tumor, aberrant breast tissue, duct lesion, nipple or areolar lesion (except 19300), open, male or female, 1 or more lesions (02/22/2016), Excision of Left Breast, Open Approach, Diagnostic (02/22/2016), hysterectomy.  Medications  Claritin 10 mg oral tablet, 10 mg, 1 tab(s), Oral, Daily,? ?Not taking  diclofenac sodium 75 mg oral delayed release tablet, 75 mg, 1 tab(s), Oral, BID, PRN,? ?Not taking: Last Dose Date/Time Unknown  hydrochlorothiazide 25 mg oral tablet, 25 mg, 1 tab(s), Oral, Daily, 11 refills  hydrocortisone topical 1% cream, 1 aleksandar, TOP, TID,? ?Not taking  simvastatin 20 mg oral tablet, 20 mg, 1 tab(s), Oral, Once a day (at bedtime), 3 refills  Allergies  iodine containing compounds  morphine  Social History  Alcohol  Never  Employment/School  DISABLED, Work/School description: DISABILITY. Operates hazardous equipment: No.  Exercise  Exercise frequency: 1-2 times/week. Self assessment: Fair condition.  Home/Environment  Lives with Alone. Living situation: Home/Independent. Alcohol abuse in household: No. Substance abuse in household: No. Smoker in household: No. Injuries/Abuse/Neglect in household: No. Feels unsafe at home: No. Safe place to go: Yes. Family/Friends available for support: No. Concern for family members at home: No. Major illness in household: No. Financial concerns: No. TV/Computer concerns: No.  Nutrition/Health  Regular, Wants to lose weight: Yes. Sleeping concerns: Yes. Feels highly stressed: Yes.  Other  Substance Abuse  Tobacco  Never smoker  Family History  Cancer: Father.  Hypertension.: Mother.  Primary malignant neoplasm of breast: Sister.  Primary malignant neoplasm of colon: Sister.

## 2022-05-10 ENCOUNTER — HOSPITAL ENCOUNTER (OUTPATIENT)
Dept: RADIOLOGY | Facility: HOSPITAL | Age: 67
Discharge: HOME | End: 2022-05-10
Attending: NURSE PRACTITIONER
Payer: MEDICARE

## 2022-05-10 DIAGNOSIS — Z12.31 BREAST CANCER SCREENING BY MAMMOGRAM: ICD-10-CM

## 2022-05-10 PROCEDURE — 77067 MAMMO DIGITAL SCREENING BILAT WITH TOMO: ICD-10-PCS | Mod: 26,,, | Performed by: RADIOLOGY

## 2022-05-10 PROCEDURE — 77067 SCR MAMMO BI INCL CAD: CPT | Mod: 26,,, | Performed by: RADIOLOGY

## 2022-05-10 PROCEDURE — 77063 BREAST TOMOSYNTHESIS BI: CPT | Mod: 26,,, | Performed by: RADIOLOGY

## 2022-05-10 PROCEDURE — 77067 SCR MAMMO BI INCL CAD: CPT | Mod: TC

## 2022-05-10 PROCEDURE — 77063 MAMMO DIGITAL SCREENING BILAT WITH TOMO: ICD-10-PCS | Mod: 26,,, | Performed by: RADIOLOGY

## 2022-07-17 ENCOUNTER — HOSPITAL ENCOUNTER (EMERGENCY)
Facility: HOSPITAL | Age: 67
Discharge: HOME | End: 2022-07-17
Attending: STUDENT IN AN ORGANIZED HEALTH CARE EDUCATION/TRAINING PROGRAM
Payer: MEDICARE

## 2022-07-17 VITALS
WEIGHT: 211.63 LBS | OXYGEN SATURATION: 97 % | SYSTOLIC BLOOD PRESSURE: 125 MMHG | HEIGHT: 61 IN | BODY MASS INDEX: 39.95 KG/M2 | DIASTOLIC BLOOD PRESSURE: 77 MMHG | HEART RATE: 71 BPM | TEMPERATURE: 97 F | RESPIRATION RATE: 20 BRPM

## 2022-07-17 DIAGNOSIS — R05.9 COUGH: ICD-10-CM

## 2022-07-17 DIAGNOSIS — U07.1 COVID-19: Primary | ICD-10-CM

## 2022-07-17 LAB
FLUAV AG UPPER RESP QL IA.RAPID: NOT DETECTED
FLUBV AG UPPER RESP QL IA.RAPID: NOT DETECTED
RSV A 5' UTR RNA NPH QL NAA+PROBE: NOT DETECTED
SARS-COV-2 RNA RESP QL NAA+PROBE: DETECTED
STREP A PCR (OHS): NOT DETECTED

## 2022-07-17 PROCEDURE — 99284 EMERGENCY DEPT VISIT MOD MDM: CPT

## 2022-07-17 PROCEDURE — 87636 SARSCOV2 & INF A&B AMP PRB: CPT | Performed by: PHYSICIAN ASSISTANT

## 2022-07-17 PROCEDURE — 87631 RESP VIRUS 3-5 TARGETS: CPT | Performed by: PHYSICIAN ASSISTANT

## 2022-07-17 RX ORDER — AZELASTINE 1 MG/ML
1 SPRAY, METERED NASAL 2 TIMES DAILY
Qty: 30 ML | Refills: 0 | Status: SHIPPED | OUTPATIENT
Start: 2022-07-17 | End: 2022-08-22

## 2022-07-17 RX ORDER — IBUPROFEN 600 MG/1
600 TABLET ORAL EVERY 6 HOURS PRN
Qty: 20 TABLET | Refills: 0 | Status: SHIPPED | OUTPATIENT
Start: 2022-07-17 | End: 2022-08-02

## 2022-07-17 RX ORDER — BENZONATATE 100 MG/1
100 CAPSULE ORAL 3 TIMES DAILY PRN
Qty: 15 CAPSULE | Refills: 0 | Status: SHIPPED | OUTPATIENT
Start: 2022-07-17 | End: 2022-07-22

## 2022-07-17 RX ORDER — CETIRIZINE HYDROCHLORIDE 10 MG/1
10 TABLET ORAL DAILY
Qty: 14 TABLET | Refills: 0 | Status: SHIPPED | OUTPATIENT
Start: 2022-07-17 | End: 2022-08-22

## 2022-07-18 ENCOUNTER — TELEPHONE (OUTPATIENT)
Dept: INTERNAL MEDICINE | Facility: CLINIC | Age: 67
End: 2022-07-18

## 2022-07-18 NOTE — ED PROVIDER NOTES
Encounter Date: 7/17/2022       History     Chief Complaint   Patient presents with    Cough     Pt c/o body aches, throat pain, nasal pain and ear congestion    COVID-19 Concerns     Patient is a 66 year old female who presents to ED with cough, body aches, throat pain, ear fullness x 3 days.  She denies fever, chills, nausea, vomiting, SOB, chest pain, abdominal pain, dizziness.     The history is provided by the patient. No  was used.     Review of patient's allergies indicates:   Allergen Reactions    Opioids - morphine analogues      Pt informed post surgery by MD that she is allergic to morphine    Iodine and iodide containing products Rash     History reviewed. No pertinent past medical history.  History reviewed. No pertinent surgical history.  History reviewed. No pertinent family history.  Social History     Tobacco Use    Smoking status: Never Smoker    Smokeless tobacco: Never Used   Substance Use Topics    Alcohol use: Never    Drug use: Never     Review of Systems   Constitutional: Negative for chills and fever.   HENT: Positive for congestion and sore throat.    Eyes: Negative.    Respiratory: Positive for cough. Negative for shortness of breath.    Cardiovascular: Negative for chest pain and palpitations.   Gastrointestinal: Negative for abdominal pain, diarrhea, nausea and vomiting.   Genitourinary: Negative for dysuria.   Musculoskeletal: Positive for myalgias.   Skin: Negative.    Neurological: Negative for dizziness, light-headedness and headaches.   Hematological: Negative.        Physical Exam     Initial Vitals [07/17/22 1235]   BP Pulse Resp Temp SpO2   120/82 77 20 97.3 °F (36.3 °C) 96 %      MAP       --         Physical Exam    Nursing note and vitals reviewed.  Constitutional: She appears well-developed and well-nourished.   HENT:   Right Ear: External ear normal.   Left Ear: External ear normal.   Nose: Nose normal.   Mouth/Throat: Oropharynx is clear and  moist.   Eyes: Conjunctivae are normal.   Neck: Neck supple.   Normal range of motion.  Cardiovascular: Normal rate, normal heart sounds and intact distal pulses.   Pulmonary/Chest: Breath sounds normal. She has no wheezes.   Abdominal: Abdomen is soft. Bowel sounds are normal.   Musculoskeletal:      Cervical back: Normal range of motion and neck supple.     Lymphadenopathy:     She has no cervical adenopathy.   Neurological: She is alert.   Skin: Skin is warm.         ED Course   Procedures  Labs Reviewed   COVID/RSV/FLU A&B PCR - Abnormal; Notable for the following components:       Result Value    SARS-CoV-2 PCR Detected (*)     All other components within normal limits   STREP GROUP A BY PCR - Normal          Imaging Results    None          Medications - No data to display  Medical Decision Making:   ED Management:  The patient is resting comfortably and in no acute distress.   I personally discussed her test results and treatment plan.  Gave strict ED precautions.  Specific conditions for return to the emergency department and importance of follow up with her primary care provider or the physician listed on the discharge instructions.  Patient voices understanding and agrees to the plan discussed. All patients' questions have been answered at this time.   She has remained hemodynamically stable throughout entire stay in ED and is stable for discharge home.             ED Course as of 07/17/22 2123   Sun Jul 17, 2022   1340 STREP A PCR ([facility]): Not Detected [ER]   1406 SARS-CoV2 (COVID-19) Qualitative PCR(!): Detected [ER]      ED Course User Index  [ER] SRINI Pérez             Clinical Impression:   Final diagnoses:  [U07.1] COVID-19 (Primary)  [R05.9] Cough          ED Disposition Condition    Discharge Stable        ED Prescriptions     Medication Sig Dispense Start Date End Date Auth. Provider    cetirizine (ZYRTEC) 10 MG tablet Take 1 tablet (10 mg total) by mouth once daily. for 14 days 14 tablet  7/17/2022 7/31/2022 SRINI Pérez    azelastine (ASTELIN) 137 mcg (0.1 %) nasal spray 1 spray (137 mcg total) by Nasal route 2 (two) times daily. for 7 days 30 mL 7/17/2022 7/24/2022 SRINI Pérez    benzonatate (TESSALON) 100 MG capsule Take 1 capsule (100 mg total) by mouth 3 (three) times daily as needed for Cough. 15 capsule 7/17/2022 7/22/2022 SRINI Pérez    ibuprofen (ADVIL,MOTRIN) 600 MG tablet Take 1 tablet (600 mg total) by mouth every 6 (six) hours as needed for Pain. 20 tablet 7/17/2022 8/2/2022 SRINI Pérez        Follow-up Information     Follow up With Specialties Details Why Contact Info    [facility] Emergency Medicine  As needed, If symptoms worsen [address] [facility] [id number] [phone]    Adia Elam, MARY Family Medicine In 1 week  [address] [facility] [address]  [phone]             SRINI Pérez  07/17/22 2122

## 2022-08-22 ENCOUNTER — OFFICE VISIT (OUTPATIENT)
Dept: INTERNAL MEDICINE | Facility: CLINIC | Age: 67
End: 2022-08-22
Payer: MEDICARE

## 2022-08-22 VITALS
HEART RATE: 55 BPM | DIASTOLIC BLOOD PRESSURE: 88 MMHG | SYSTOLIC BLOOD PRESSURE: 146 MMHG | BODY MASS INDEX: 40.46 KG/M2 | HEIGHT: 61 IN | WEIGHT: 214.31 LBS | TEMPERATURE: 98 F | RESPIRATION RATE: 18 BRPM

## 2022-08-22 DIAGNOSIS — Z12.11 ENCOUNTER FOR COLORECTAL CANCER SCREENING: ICD-10-CM

## 2022-08-22 DIAGNOSIS — E66.01 CLASS 3 SEVERE OBESITY DUE TO EXCESS CALORIES WITH SERIOUS COMORBIDITY AND BODY MASS INDEX (BMI) OF 40.0 TO 44.9 IN ADULT: ICD-10-CM

## 2022-08-22 DIAGNOSIS — I10 HYPERTENSION, UNSPECIFIED TYPE: Primary | ICD-10-CM

## 2022-08-22 DIAGNOSIS — G89.29 CHRONIC PAIN OF RIGHT KNEE: Chronic | ICD-10-CM

## 2022-08-22 DIAGNOSIS — M25.561 CHRONIC PAIN OF RIGHT KNEE: Chronic | ICD-10-CM

## 2022-08-22 DIAGNOSIS — E66.9 OBESITY, UNSPECIFIED CLASSIFICATION, UNSPECIFIED OBESITY TYPE, UNSPECIFIED WHETHER SERIOUS COMORBIDITY PRESENT: ICD-10-CM

## 2022-08-22 DIAGNOSIS — E78.2 MIXED HYPERLIPIDEMIA: Primary | Chronic | ICD-10-CM

## 2022-08-22 DIAGNOSIS — E55.9 VITAMIN D DEFICIENCY: Chronic | ICD-10-CM

## 2022-08-22 DIAGNOSIS — Z12.12 ENCOUNTER FOR COLORECTAL CANCER SCREENING: ICD-10-CM

## 2022-08-22 DIAGNOSIS — Z00.00 WELLNESS EXAMINATION: ICD-10-CM

## 2022-08-22 DIAGNOSIS — Z12.39 ENCOUNTER FOR BREAST CANCER SCREENING USING NON-MAMMOGRAM MODALITY: ICD-10-CM

## 2022-08-22 DIAGNOSIS — I10 BENIGN HYPERTENSION: Chronic | ICD-10-CM

## 2022-08-22 DIAGNOSIS — E78.5 HYPERLIPIDEMIA, UNSPECIFIED HYPERLIPIDEMIA TYPE: ICD-10-CM

## 2022-08-22 DIAGNOSIS — E66.01 CLASS 3 SEVERE OBESITY DUE TO EXCESS CALORIES WITH SERIOUS COMORBIDITY AND BODY MASS INDEX (BMI) OF 40.0 TO 44.9 IN ADULT: Chronic | ICD-10-CM

## 2022-08-22 PROBLEM — E66.813 CLASS 3 SEVERE OBESITY DUE TO EXCESS CALORIES WITH SERIOUS COMORBIDITY AND BODY MASS INDEX (BMI) OF 40.0 TO 44.9 IN ADULT: Chronic | Status: ACTIVE | Noted: 2022-08-22

## 2022-08-22 PROCEDURE — 1160F RVW MEDS BY RX/DR IN RCRD: CPT | Mod: CPTII,,, | Performed by: NURSE PRACTITIONER

## 2022-08-22 PROCEDURE — 3066F NEPHROPATHY DOC TX: CPT | Mod: CPTII,,, | Performed by: NURSE PRACTITIONER

## 2022-08-22 PROCEDURE — 3008F PR BODY MASS INDEX (BMI) DOCUMENTED: ICD-10-PCS | Mod: CPTII,,, | Performed by: NURSE PRACTITIONER

## 2022-08-22 PROCEDURE — 3288F PR FALLS RISK ASSESSMENT DOCUMENTED: ICD-10-PCS | Mod: CPTII,,, | Performed by: NURSE PRACTITIONER

## 2022-08-22 PROCEDURE — 3008F BODY MASS INDEX DOCD: CPT | Mod: CPTII,,, | Performed by: NURSE PRACTITIONER

## 2022-08-22 PROCEDURE — 99214 OFFICE O/P EST MOD 30 MIN: CPT | Mod: S$PBB,,, | Performed by: NURSE PRACTITIONER

## 2022-08-22 PROCEDURE — 3077F SYST BP >= 140 MM HG: CPT | Mod: CPTII,,, | Performed by: NURSE PRACTITIONER

## 2022-08-22 PROCEDURE — 3060F PR POS MICROALBUMINURIA RESULT DOCUMENTED/REVIEW: ICD-10-PCS | Mod: CPTII,,, | Performed by: NURSE PRACTITIONER

## 2022-08-22 PROCEDURE — 99215 OFFICE O/P EST HI 40 MIN: CPT | Mod: PBBFAC | Performed by: NURSE PRACTITIONER

## 2022-08-22 PROCEDURE — 1126F PR PAIN SEVERITY QUANTIFIED, NO PAIN PRESENT: ICD-10-PCS | Mod: CPTII,,, | Performed by: NURSE PRACTITIONER

## 2022-08-22 PROCEDURE — 4010F PR ACE/ARB THEARPY RXD/TAKEN: ICD-10-PCS | Mod: CPTII,,, | Performed by: NURSE PRACTITIONER

## 2022-08-22 PROCEDURE — 1160F PR REVIEW ALL MEDS BY PRESCRIBER/CLIN PHARMACIST DOCUMENTED: ICD-10-PCS | Mod: CPTII,,, | Performed by: NURSE PRACTITIONER

## 2022-08-22 PROCEDURE — 3079F PR MOST RECENT DIASTOLIC BLOOD PRESSURE 80-89 MM HG: ICD-10-PCS | Mod: CPTII,,, | Performed by: NURSE PRACTITIONER

## 2022-08-22 PROCEDURE — 1126F AMNT PAIN NOTED NONE PRSNT: CPT | Mod: CPTII,,, | Performed by: NURSE PRACTITIONER

## 2022-08-22 PROCEDURE — 4010F ACE/ARB THERAPY RXD/TAKEN: CPT | Mod: CPTII,,, | Performed by: NURSE PRACTITIONER

## 2022-08-22 PROCEDURE — 1159F MED LIST DOCD IN RCRD: CPT | Mod: CPTII,,, | Performed by: NURSE PRACTITIONER

## 2022-08-22 PROCEDURE — 3077F PR MOST RECENT SYSTOLIC BLOOD PRESSURE >= 140 MM HG: ICD-10-PCS | Mod: CPTII,,, | Performed by: NURSE PRACTITIONER

## 2022-08-22 PROCEDURE — 1101F PR PT FALLS ASSESS DOC 0-1 FALLS W/OUT INJ PAST YR: ICD-10-PCS | Mod: CPTII,,, | Performed by: NURSE PRACTITIONER

## 2022-08-22 PROCEDURE — 3066F PR DOCUMENTATION OF TREATMENT FOR NEPHROPATHY: ICD-10-PCS | Mod: CPTII,,, | Performed by: NURSE PRACTITIONER

## 2022-08-22 PROCEDURE — 3079F DIAST BP 80-89 MM HG: CPT | Mod: CPTII,,, | Performed by: NURSE PRACTITIONER

## 2022-08-22 PROCEDURE — 3288F FALL RISK ASSESSMENT DOCD: CPT | Mod: CPTII,,, | Performed by: NURSE PRACTITIONER

## 2022-08-22 PROCEDURE — 1101F PT FALLS ASSESS-DOCD LE1/YR: CPT | Mod: CPTII,,, | Performed by: NURSE PRACTITIONER

## 2022-08-22 PROCEDURE — 3060F POS MICROALBUMINURIA REV: CPT | Mod: CPTII,,, | Performed by: NURSE PRACTITIONER

## 2022-08-22 PROCEDURE — 1159F PR MEDICATION LIST DOCUMENTED IN MEDICAL RECORD: ICD-10-PCS | Mod: CPTII,,, | Performed by: NURSE PRACTITIONER

## 2022-08-22 PROCEDURE — 99214 PR OFFICE/OUTPT VISIT, EST, LEVL IV, 30-39 MIN: ICD-10-PCS | Mod: S$PBB,,, | Performed by: NURSE PRACTITIONER

## 2022-08-22 RX ORDER — DICLOFENAC SODIUM 10 MG/G
2 GEL TOPICAL 2 TIMES DAILY PRN
Qty: 50 G | Refills: 1 | Status: SHIPPED | OUTPATIENT
Start: 2022-08-22 | End: 2022-11-20

## 2022-08-22 RX ORDER — SIMVASTATIN 10 MG/1
10 TABLET, FILM COATED ORAL DAILY
Qty: 90 TABLET | Refills: 1 | Status: SHIPPED | OUTPATIENT
Start: 2022-08-22 | End: 2022-12-28

## 2022-08-22 RX ORDER — ERGOCALCIFEROL 1.25 MG/1
50000 CAPSULE ORAL
Qty: 12 CAPSULE | Refills: 2 | Status: SHIPPED | OUTPATIENT
Start: 2022-08-22 | End: 2022-11-20

## 2022-08-22 RX ORDER — HYDROCHLOROTHIAZIDE 12.5 MG/1
12.5 TABLET ORAL DAILY
Qty: 90 TABLET | Refills: 1 | Status: SHIPPED | OUTPATIENT
Start: 2022-08-22 | End: 2022-12-28

## 2022-08-22 RX ORDER — LOSARTAN POTASSIUM 100 MG/1
100 TABLET ORAL DAILY
Qty: 90 TABLET | Refills: 1 | Status: SHIPPED | OUTPATIENT
Start: 2022-08-22 | End: 2022-12-28 | Stop reason: SDUPTHER

## 2022-08-22 RX ORDER — METOPROLOL SUCCINATE 25 MG/1
25 TABLET, EXTENDED RELEASE ORAL DAILY
Qty: 90 TABLET | Refills: 1 | Status: SHIPPED | OUTPATIENT
Start: 2022-08-22 | End: 2022-12-28 | Stop reason: SDUPTHER

## 2022-08-22 NOTE — ASSESSMENT & PLAN NOTE
Continue Simvastatin 10 mg daily  Weight loss encouraged  Low fat/high fiber diet  Increase physical activity  Chol: 246  HDL: 49  Tri  LDL: 169

## 2022-08-22 NOTE — ASSESSMENT & PLAN NOTE
B/P: 146/88  UA Creatinine: 43.9  UA Microalbumin: 29.2  EK2021  Continue Losartan 100 mg daily, Metoprolol Succ 25 mg daily  Start HCTZ 12.5 mg daily  DASH diet  Encouraged home blood pressure monitoring

## 2022-08-22 NOTE — PROGRESS NOTES
Subjective:       Patient ID: Rabia Valencia is a 67 y.o. female.    Chief Complaint: Follow-up (Lab results)    Patient has diagnosis of HTN, HLD, Hx of COVID-19. Patient seen in clinic today for medication refills. Patient last seen in clinic on 04/07/2022. Patient was started on Simvastatin 10 mg daily for HLD. Patient states she forgets to take her cholesterol medication. Patient informed it is ok to take in the morning with her blood pressure medication if that is easier for her to remember. Patient states understanding. Patient instructed of importance of taking cholesterol medication to reduce changes of a stroke or heart attack. Patient states understanding. B/P noted to be 146/88. Patient states she did take her blood pressure medication today. Patient states she does check B/P at home. Patient denies headache, blurred vision, dizziness, chest pain. Patient denies any other acute complaints.     Patient seen in ED on 07/17/2022. Patient is a 66 year old female who presents to ED with cough, body aches, throat pain, ear fullness x 3 days. She denies fever, chills, nausea, vomiting, SOB, chest pain, abdominal pain, dizziness. COVID positive. The patient is resting comfortably and in no acute distress. I personally discussed her test results and treatment plan. Gave strict ED precautions. Specific conditions for return to the emergency department and importance of follow up with her primary care provider or the physician listed on the discharge instructions. Patient voices understanding and agrees to the plan discussed. All patients' questions have been answered at this time. She has remained hemodynamically stable throughout entire stay in ED and is stable for discharge home.    Patient was seen by Neurology Clinic for headaches. Last appointment on 02/28/2022. Patient instructed to continue blood pressure management with PCP. Headache education provided - including good sleep hygiene, stress management,  medication overuse education provided - using more 3 OTC per week may worsen headaches, High intensity interval training has shown to reduce headache frequency. Low carb, high protein has shown to reduce headache frequency. Patient is instructed to keep headache diary. Patient to follow up as needed.      Mammogram: 05/10/2022, benign, MRI/MMG every 6 months, MRI breast ordered for 11/10/2022  PAP: Hysterectomy  FIT: Cologuard ordered  Bone Density: 04/27/2021, normal  Medicare Wellness: ordered    Review of Systems   Constitutional: Negative.    HENT: Negative.    Eyes: Negative.    Respiratory: Negative.    Cardiovascular: Negative.    Gastrointestinal: Negative.    Endocrine: Negative.    Genitourinary: Negative.    Musculoskeletal: Positive for arthralgias.   Integumentary:  Negative.   Allergic/Immunologic: Negative.    Neurological: Negative.    Hematological: Negative.    Psychiatric/Behavioral: Negative.          Objective:      Physical Exam  Vitals reviewed.   Constitutional:       Appearance: Normal appearance.   HENT:      Head: Normocephalic and atraumatic.      Mouth/Throat:      Mouth: Mucous membranes are moist.      Pharynx: Oropharynx is clear.   Eyes:      Extraocular Movements: Extraocular movements intact.      Conjunctiva/sclera: Conjunctivae normal.      Pupils: Pupils are equal, round, and reactive to light.   Cardiovascular:      Rate and Rhythm: Normal rate and regular rhythm.      Heart sounds: Normal heart sounds.   Pulmonary:      Effort: Pulmonary effort is normal.      Breath sounds: Normal breath sounds.   Abdominal:      General: Bowel sounds are normal.   Musculoskeletal:         General: Normal range of motion.      Cervical back: Normal range of motion.   Skin:     General: Skin is warm and dry.   Neurological:      Mental Status: She is alert and oriented to person, place, and time.   Psychiatric:         Mood and Affect: Mood normal.         Behavior: Behavior normal.          Assessment:       Problem List Items Addressed This Visit        Cardiac/Vascular    Benign hypertension (Chronic)     B/P: 146/88  UA Creatinine: 43.9  UA Microalbumin: 29.2  EK2021  Continue Losartan 100 mg daily, Metoprolol Succ 25 mg daily  Start HCTZ 12.5 mg daily  DASH diet  Encouraged home blood pressure monitoring           Relevant Orders    CBC Auto Differential    Comprehensive Metabolic Panel    Hyperlipidemia - Primary (Chronic)     Continue Simvastatin 10 mg daily  Weight loss encouraged  Low fat/high fiber diet  Increase physical activity  Chol: 246  HDL: 49  Tri  LDL: 169           Relevant Orders    Lipid Panel       Endocrine    Class 3 severe obesity due to excess calories with serious comorbidity and body mass index (BMI) of 40.0 to 44.9 in adult (Chronic)     BMI: 40.46  TSH: 1.66  Vitamin D level: 19.5  Weight Loss Encouraged  Increase Physical Activity           Vitamin D deficiency (Chronic)     Vitamin D level: 19.5  Start Vitamin D 50,000 units weekly  Repeat Vitamin D level ordered           Relevant Orders    Vitamin D       Orthopedic    Chronic pain of right knee (Chronic)     Start Voltaren 1% gel bid prn pain             Other Visit Diagnoses     Encounter for breast cancer screening using non-mammogram modality        Relevant Orders    MRI Breast w/wo Contrast, w/CAD, Bilateral    Encounter for colorectal cancer screening        Relevant Orders    Cologuard Screening (Multitarget Stool DNA)    Wellness examination        Relevant Orders    Hepatitis C antibody          Plan:    Patient to follow up in 4 months with labs to be done prior to appointment to reassess HLD, Vitamin D level. Virtual Visit per patient request.    Universal Safety Interventions

## 2022-09-24 LAB — NONINV COLON CA DNA+OCC BLD SCRN STL QL: POSITIVE

## 2022-09-26 DIAGNOSIS — R19.5 POSITIVE COLORECTAL CANCER SCREENING USING COLOGUARD TEST: Primary | ICD-10-CM

## 2022-10-06 DIAGNOSIS — Z12.11 SCREEN FOR COLON CANCER: Primary | ICD-10-CM

## 2022-10-06 RX ORDER — POLYETHYLENE GLYCOL 3350, SODIUM SULFATE, SODIUM CHLORIDE, POTASSIUM CHLORIDE, SODIUM ASCORBATE, AND ASCORBIC ACID 7.5-2.691G
2000 KIT ORAL ONCE
Qty: 1 KIT | Refills: 0 | Status: SHIPPED | OUTPATIENT
Start: 2022-10-06 | End: 2022-10-06

## 2022-10-19 NOTE — TELEPHONE ENCOUNTER
Contacted to reschedule appointment due to testing positive for COVID on 07/17/22. Rescheduled for 07/28/22 @ 12:45pm. She voiced understanding.ntacted to

## 2022-11-15 ENCOUNTER — HOSPITAL ENCOUNTER (EMERGENCY)
Facility: HOSPITAL | Age: 67
Discharge: HOME | End: 2022-11-15
Attending: FAMILY MEDICINE
Payer: MEDICARE

## 2022-11-15 VITALS
SYSTOLIC BLOOD PRESSURE: 149 MMHG | WEIGHT: 216.69 LBS | DIASTOLIC BLOOD PRESSURE: 89 MMHG | OXYGEN SATURATION: 96 % | HEART RATE: 58 BPM | BODY MASS INDEX: 38.39 KG/M2 | HEIGHT: 63 IN | TEMPERATURE: 99 F | RESPIRATION RATE: 16 BRPM

## 2022-11-15 DIAGNOSIS — M79.89 LEFT LEG SWELLING: ICD-10-CM

## 2022-11-15 DIAGNOSIS — M79.605 LEFT LEG PAIN: Primary | ICD-10-CM

## 2022-11-15 DIAGNOSIS — R53.1 WEAKNESS: ICD-10-CM

## 2022-11-15 PROBLEM — J30.9 ALLERGIC RHINITIS: Status: ACTIVE | Noted: 2022-11-15

## 2022-11-15 PROBLEM — J32.9 CHRONIC SINUSITIS: Status: ACTIVE | Noted: 2022-11-15

## 2022-11-15 PROBLEM — H81.4 CENTRAL POSITIONAL VERTIGO: Status: ACTIVE | Noted: 2022-11-15

## 2022-11-15 PROCEDURE — 93005 ELECTROCARDIOGRAM TRACING: CPT

## 2022-11-15 PROCEDURE — 63600175 PHARM REV CODE 636 W HCPCS: Performed by: PHYSICIAN ASSISTANT

## 2022-11-15 PROCEDURE — 99285 EMERGENCY DEPT VISIT HI MDM: CPT | Mod: 25

## 2022-11-15 PROCEDURE — 96372 THER/PROPH/DIAG INJ SC/IM: CPT | Performed by: PHYSICIAN ASSISTANT

## 2022-11-15 RX ORDER — METHOCARBAMOL 500 MG/1
500 TABLET, FILM COATED ORAL 2 TIMES DAILY PRN
Qty: 10 TABLET | Refills: 0 | Status: SHIPPED | OUTPATIENT
Start: 2022-11-15 | End: 2022-11-20

## 2022-11-15 RX ORDER — DICLOFENAC SODIUM 75 MG/1
75 TABLET, DELAYED RELEASE ORAL 2 TIMES DAILY PRN
Qty: 20 TABLET | Refills: 0 | Status: SHIPPED | OUTPATIENT
Start: 2022-11-15 | End: 2023-04-06

## 2022-11-15 RX ORDER — KETOROLAC TROMETHAMINE 30 MG/ML
30 INJECTION, SOLUTION INTRAMUSCULAR; INTRAVENOUS
Status: COMPLETED | OUTPATIENT
Start: 2022-11-15 | End: 2022-11-15

## 2022-11-15 RX ADMIN — KETOROLAC TROMETHAMINE 30 MG: 30 INJECTION, SOLUTION INTRAMUSCULAR at 11:11

## 2022-11-15 NOTE — ED PROVIDER NOTES
Encounter Date: 11/15/2022       History     Chief Complaint   Patient presents with    Leg Swelling     Presents with left leg swelling.  Left leg appears larger than right. Reports some sob     Rabia Valencia is a 67 y.o. female with a history of HTN and HLD who presents to the ED complaining of left lower extremity pain and swelling. She reports pain has been present x 1 week, worse at night when she is trying to sleep. When the pain occurs at night it makes her chest tight. She denies falls, trauma, or injury. She denies history of blood clot, recent surgery, or prolonged travel. Not a smoker. She denies fevers, chills, SOB, DUPREE, chest pain at present, N/V/D.    The history is provided by the patient. No  was used.   Review of patient's allergies indicates:   Allergen Reactions    Opioids - morphine analogues      Pt informed post surgery by MD that she is allergic to morphine    Iodine and iodide containing products Rash     Past Medical History:   Diagnosis Date    Hyperlipidemia     Hypertension      Past Surgical History:   Procedure Laterality Date    BREAST SURGERY       Family History   Problem Relation Age of Onset    Hypertension Mother     Cancer Father     Breast cancer Sister      Social History     Tobacco Use    Smoking status: Never    Smokeless tobacco: Never   Substance Use Topics    Alcohol use: Never    Drug use: Never     Review of Systems   Constitutional:  Negative for chills and fever.   HENT:  Negative for congestion and rhinorrhea.    Eyes:  Negative for redness and itching.   Respiratory:  Negative for cough and stridor.    Cardiovascular:  Positive for leg swelling. Negative for chest pain.   Gastrointestinal:  Negative for abdominal pain and diarrhea.   Genitourinary:  Negative for dysuria and frequency.   Musculoskeletal:  Negative for back pain and myalgias.   Skin:  Negative for rash and wound.   Neurological:  Negative for dizziness and light-headedness.    Psychiatric/Behavioral:  Negative for agitation and confusion.      Physical Exam     Initial Vitals [11/15/22 0902]   BP Pulse Resp Temp SpO2   (!) 156/60 82 18 98.8 °F (37.1 °C) 98 %      MAP       --         Physical Exam    Nursing note and vitals reviewed.  Constitutional: She appears well-developed and well-nourished. No distress.   HENT:   Head: Normocephalic and atraumatic.   Mouth/Throat: No oropharyngeal exudate.   Eyes: EOM are normal. No scleral icterus.   Neck: Neck supple.   Normal range of motion.  Cardiovascular:  Normal rate and regular rhythm.           No murmur heard.  Pulmonary/Chest: No respiratory distress. She has no wheezes.   Abdominal: Abdomen is soft. She exhibits no distension. There is no abdominal tenderness.   Musculoskeletal:         General: No tenderness. Normal range of motion.      Cervical back: Normal range of motion and neck supple.      Comments: Bilateral non-pitting lower extremity edema (L>R). No warmth, erythema, or tenderness throughout lower extremity. Full ROM without pain.      Neurological: She is alert and oriented to person, place, and time. No cranial nerve deficit.   Skin: Skin is warm and dry. Capillary refill takes less than 2 seconds. No erythema.   Psychiatric: She has a normal mood and affect. Thought content normal.       ED Course   Procedures  Labs Reviewed - No data to display     ECG Results              EKG 12-lead (Weakness) Age > 50 (Final result)  Result time 11/15/22 09:21:14      Final result by Interface, Lab In [facility] (11/15/22 09:21:14)                   Narrative:    Test Reason : R53.1,    Vent. Rate : 063 BPM     Atrial Rate : 063 BPM     P-R Int : 138 ms          QRS Dur : 090 ms      QT Int : 390 ms       P-R-T Axes : 023 023 -10 degrees     QTc Int : 399 ms    Normal sinus rhythm  Minimal voltage criteria for LVH, may be normal variant  Borderline Abnormal ECG  When compared with ECG of 02-MAY-2022 08:31,  T wave inversion now evident  in Inferior leads  Confirmed by Nishant Mccarthy MD ([id number]) on 11/15/2022 9:21:04 AM    Referred By: [email or web address]   SELF           Confirmed By:Nishant Mccarthy MD                      ED Interpretation by Noe Wynne MD (11/15/22 09:18:52, [facility], Emergency Medicine)    Normal sinus rhythm at a rate of 63, no signs of ST elevation or depression, normal axis, normal intervals with a QTC of 399                                  Imaging Results    None     Rabia Valencia   Ultrasound doppler venous DVT leg left  Order# [id number]  Ordering physician: Paula Saavedra PA-C Study date: 11/15/22     Reason for Exam  Priority: STAT  Dx: Left leg swelling [M79.89 (ICD-10-CM)]     Conclusion       There is no evidence of a left lower extremity DVT.  The contralateral (right) common femoral vein is patent.     There was no scanned evidence of deep vein thrombosis within the visualized veins of the left lower extremity.     Medications   ketorolac injection 30 mg (30 mg Intramuscular Given 11/15/22 1135)     Medical Decision Making:   ED Management:  The patient is resting comfortably and in no acute distress.  She states that her symptoms have improved after treatment in Emergency Department. I personally discussed her test results and treatment plan.  Gave strict ED precautions and specific conditions for return to the emergency department and importance of follow up with pcp.  Patient voices understanding and agrees to the plan discussed. All patients' questions have been answered at this time. She has remained hemodynamically stable throughout entire stay in ED and is stable for discharge home.                        Clinical Impression:   Final diagnoses:  [R53.1] Weakness  [M79.89] Left leg swelling  [M79.605] Left leg pain (Primary)        ED Disposition Condition    Discharge Stable          ED Prescriptions       Medication Sig Dispense Start Date End Date Auth. Provider     diclofenac (VOLTAREN) 75 MG EC tablet Take 1 tablet (75 mg total) by mouth 2 (two) times daily as needed (pain). 20 tablet 11/15/2022 -- Paula Saavedra PA-C    methocarbamoL (ROBAXIN) 500 MG Tab Take 1 tablet (500 mg total) by mouth 2 (two) times daily as needed (pain, spasms). 10 tablet 11/15/2022 11/20/2022 Paula Saavedra PA-C          Follow-up Information       Follow up With Specialties Details Why Contact Info    Adia Elam, MARY Family Medicine In 3 days Hospital follow up [address] [facility] [address]  [phone]      [facility] Emergency Medicine  If symptoms worsen [address] [facility] [id number] [phone]             Paula Saavedra PA-C  11/15/22 1225

## 2022-12-28 ENCOUNTER — OFFICE VISIT (OUTPATIENT)
Dept: INTERNAL MEDICINE | Facility: CLINIC | Age: 67
End: 2022-12-28
Payer: MEDICARE

## 2022-12-28 ENCOUNTER — LAB VISIT (OUTPATIENT)
Dept: LAB | Facility: HOSPITAL | Age: 67
End: 2022-12-28
Attending: NURSE PRACTITIONER
Payer: MEDICARE

## 2022-12-28 DIAGNOSIS — I10 BENIGN HYPERTENSION: Chronic | ICD-10-CM

## 2022-12-28 DIAGNOSIS — I10 BENIGN HYPERTENSION: ICD-10-CM

## 2022-12-28 DIAGNOSIS — E78.2 MIXED HYPERLIPIDEMIA: Chronic | ICD-10-CM

## 2022-12-28 DIAGNOSIS — E55.9 VITAMIN D DEFICIENCY: Chronic | ICD-10-CM

## 2022-12-28 DIAGNOSIS — Z00.00 WELLNESS EXAMINATION: ICD-10-CM

## 2022-12-28 DIAGNOSIS — E78.2 MIXED HYPERLIPIDEMIA: Primary | Chronic | ICD-10-CM

## 2022-12-28 DIAGNOSIS — E66.01 CLASS 3 SEVERE OBESITY DUE TO EXCESS CALORIES WITH SERIOUS COMORBIDITY AND BODY MASS INDEX (BMI) OF 40.0 TO 44.9 IN ADULT: Chronic | ICD-10-CM

## 2022-12-28 LAB
ALBUMIN SERPL-MCNC: 3.8 G/DL (ref 3.4–4.8)
ALBUMIN/GLOB SERPL: 0.9 RATIO (ref 1.1–2)
ALP SERPL-CCNC: 116 UNIT/L (ref 40–150)
ALT SERPL-CCNC: 12 UNIT/L (ref 0–55)
AST SERPL-CCNC: 14 UNIT/L (ref 5–34)
BASOPHILS # BLD AUTO: 0.06 X10(3)/MCL (ref 0–0.2)
BASOPHILS NFR BLD AUTO: 0.9 %
BILIRUBIN DIRECT+TOT PNL SERPL-MCNC: 0.4 MG/DL
BUN SERPL-MCNC: 11.6 MG/DL (ref 9.8–20.1)
CALCIUM SERPL-MCNC: 9.9 MG/DL (ref 8.4–10.2)
CHLORIDE SERPL-SCNC: 108 MMOL/L (ref 98–107)
CHOLEST SERPL-MCNC: 261 MG/DL
CHOLEST/HDLC SERPL: 6 {RATIO} (ref 0–5)
CO2 SERPL-SCNC: 28 MMOL/L (ref 23–31)
CREAT SERPL-MCNC: 0.9 MG/DL (ref 0.55–1.02)
DEPRECATED CALCIDIOL+CALCIFEROL SERPL-MC: 17.6 NG/ML (ref 30–80)
EOSINOPHIL # BLD AUTO: 0.08 X10(3)/MCL (ref 0–0.9)
EOSINOPHIL NFR BLD AUTO: 1.2 %
ERYTHROCYTE [DISTWIDTH] IN BLOOD BY AUTOMATED COUNT: 13.5 % (ref 11–14.5)
GFR SERPLBLD CREATININE-BSD FMLA CKD-EPI: >60 MLS/MIN/1.73/M2
GLOBULIN SER-MCNC: 4.1 GM/DL (ref 2.4–3.5)
GLUCOSE SERPL-MCNC: 99 MG/DL (ref 82–115)
HCT VFR BLD AUTO: 41.9 % (ref 37–47)
HCV AB SERPL QL IA: NONREACTIVE
HDLC SERPL-MCNC: 45 MG/DL (ref 35–60)
HGB BLD-MCNC: 13 GM/DL (ref 12–16)
IMM GRANULOCYTES # BLD AUTO: 0.01 X10(3)/MCL (ref 0–0.04)
IMM GRANULOCYTES NFR BLD AUTO: 0.2 %
LDLC SERPL CALC-MCNC: 180 MG/DL (ref 50–140)
LYMPHOCYTES # BLD AUTO: 2.38 X10(3)/MCL (ref 0.6–4.6)
LYMPHOCYTES NFR BLD AUTO: 36.1 %
MCH RBC QN AUTO: 28.7 PG
MCHC RBC AUTO-ENTMCNC: 31 MG/DL (ref 33–36)
MCV RBC AUTO: 92.5 FL (ref 80–94)
MONOCYTES # BLD AUTO: 0.51 X10(3)/MCL (ref 0.1–1.3)
MONOCYTES NFR BLD AUTO: 7.7 %
NEUTROPHILS # BLD AUTO: 3.55 X10(3)/MCL (ref 2.1–9.2)
NEUTROPHILS NFR BLD AUTO: 53.9 %
NRBC BLD AUTO-RTO: 0 % (ref 0–1)
PLATELET # BLD AUTO: 298 X10(3)/MCL (ref 140–371)
PMV BLD AUTO: 9.9 FL (ref 9.4–12.4)
POTASSIUM SERPL-SCNC: 4 MMOL/L (ref 3.5–5.1)
PROT SERPL-MCNC: 7.9 GM/DL (ref 5.8–7.6)
RBC # BLD AUTO: 4.53 X10(6)/MCL (ref 4.2–5.4)
SODIUM SERPL-SCNC: 142 MMOL/L (ref 136–145)
TRIGL SERPL-MCNC: 180 MG/DL (ref 37–140)
VLDLC SERPL CALC-MCNC: 36 MG/DL
WBC # SPEC AUTO: 6.6 X10(3)/MCL (ref 4.5–11.5)

## 2022-12-28 PROCEDURE — 3066F PR DOCUMENTATION OF TREATMENT FOR NEPHROPATHY: ICD-10-PCS | Mod: CPTII,95,, | Performed by: NURSE PRACTITIONER

## 2022-12-28 PROCEDURE — 1101F PR PT FALLS ASSESS DOC 0-1 FALLS W/OUT INJ PAST YR: ICD-10-PCS | Mod: CPTII,95,, | Performed by: NURSE PRACTITIONER

## 2022-12-28 PROCEDURE — 1160F RVW MEDS BY RX/DR IN RCRD: CPT | Mod: CPTII,95,, | Performed by: NURSE PRACTITIONER

## 2022-12-28 PROCEDURE — 99442 PR PHYSICIAN TELEPHONE EVALUATION 11-20 MIN: CPT | Mod: FQ,95,, | Performed by: NURSE PRACTITIONER

## 2022-12-28 PROCEDURE — 3060F POS MICROALBUMINURIA REV: CPT | Mod: CPTII,95,, | Performed by: NURSE PRACTITIONER

## 2022-12-28 PROCEDURE — 3288F FALL RISK ASSESSMENT DOCD: CPT | Mod: CPTII,95,, | Performed by: NURSE PRACTITIONER

## 2022-12-28 PROCEDURE — 1160F PR REVIEW ALL MEDS BY PRESCRIBER/CLIN PHARMACIST DOCUMENTED: ICD-10-PCS | Mod: CPTII,95,, | Performed by: NURSE PRACTITIONER

## 2022-12-28 PROCEDURE — 4010F PR ACE/ARB THEARPY RXD/TAKEN: ICD-10-PCS | Mod: CPTII,95,, | Performed by: NURSE PRACTITIONER

## 2022-12-28 PROCEDURE — 99442 PR PHYSICIAN TELEPHONE EVALUATION 11-20 MIN: ICD-10-PCS | Mod: FQ,95,, | Performed by: NURSE PRACTITIONER

## 2022-12-28 PROCEDURE — 85025 COMPLETE CBC W/AUTO DIFF WBC: CPT

## 2022-12-28 PROCEDURE — 1159F MED LIST DOCD IN RCRD: CPT | Mod: CPTII,95,, | Performed by: NURSE PRACTITIONER

## 2022-12-28 PROCEDURE — 1159F PR MEDICATION LIST DOCUMENTED IN MEDICAL RECORD: ICD-10-PCS | Mod: CPTII,95,, | Performed by: NURSE PRACTITIONER

## 2022-12-28 PROCEDURE — 80061 LIPID PANEL: CPT

## 2022-12-28 PROCEDURE — 80053 COMPREHEN METABOLIC PANEL: CPT

## 2022-12-28 PROCEDURE — 3288F PR FALLS RISK ASSESSMENT DOCUMENTED: ICD-10-PCS | Mod: CPTII,95,, | Performed by: NURSE PRACTITIONER

## 2022-12-28 PROCEDURE — 82306 VITAMIN D 25 HYDROXY: CPT

## 2022-12-28 PROCEDURE — 86803 HEPATITIS C AB TEST: CPT

## 2022-12-28 PROCEDURE — 3060F PR POS MICROALBUMINURIA RESULT DOCUMENTED/REVIEW: ICD-10-PCS | Mod: CPTII,95,, | Performed by: NURSE PRACTITIONER

## 2022-12-28 PROCEDURE — 3066F NEPHROPATHY DOC TX: CPT | Mod: CPTII,95,, | Performed by: NURSE PRACTITIONER

## 2022-12-28 PROCEDURE — 1101F PT FALLS ASSESS-DOCD LE1/YR: CPT | Mod: CPTII,95,, | Performed by: NURSE PRACTITIONER

## 2022-12-28 PROCEDURE — 36415 COLL VENOUS BLD VENIPUNCTURE: CPT

## 2022-12-28 PROCEDURE — 4010F ACE/ARB THERAPY RXD/TAKEN: CPT | Mod: CPTII,95,, | Performed by: NURSE PRACTITIONER

## 2022-12-28 RX ORDER — SIMVASTATIN 20 MG/1
20 TABLET, FILM COATED ORAL NIGHTLY
Qty: 90 TABLET | Refills: 1 | Status: SHIPPED | OUTPATIENT
Start: 2022-12-28 | End: 2023-04-06 | Stop reason: SDUPTHER

## 2022-12-28 RX ORDER — LOSARTAN POTASSIUM 100 MG/1
100 TABLET ORAL DAILY
Qty: 90 TABLET | Refills: 1 | Status: SHIPPED | OUTPATIENT
Start: 2022-12-28 | End: 2023-04-06 | Stop reason: SDUPTHER

## 2022-12-28 RX ORDER — METOPROLOL SUCCINATE 25 MG/1
25 TABLET, EXTENDED RELEASE ORAL DAILY
Qty: 90 TABLET | Refills: 1 | Status: SHIPPED | OUTPATIENT
Start: 2022-12-28 | End: 2023-04-06 | Stop reason: SDUPTHER

## 2022-12-28 RX ORDER — DICLOFENAC SODIUM 10 MG/G
2 GEL TOPICAL 2 TIMES DAILY PRN
COMMUNITY
Start: 2022-08-22 | End: 2023-05-22

## 2022-12-28 RX ORDER — ERGOCALCIFEROL 1.25 MG/1
50000 CAPSULE ORAL
Qty: 12 CAPSULE | Refills: 2 | Status: SHIPPED | OUTPATIENT
Start: 2022-12-28 | End: 2023-04-06 | Stop reason: SDUPTHER

## 2022-12-28 NOTE — ASSESSMENT & PLAN NOTE
BMI: 40.46  TSH: 1.66  Vitamin D level: 17.6  HgbA1c: glucose WNL  Weight Loss Encouraged  Increase Physical Activity

## 2022-12-28 NOTE — ASSESSMENT & PLAN NOTE
B/P: deferred due to audio visit  UA Creatinine: 43.9  UA Microalbumin: 29.2  EK2021  Continue Losartan 100 mg daily, Metoprolol Succ 25 mg daily  DASH diet  Encouraged home blood pressure monitoring

## 2022-12-28 NOTE — ASSESSMENT & PLAN NOTE
Increase Simvastatin to 20 mg daily  Weight loss encouraged  Low fat/high fiber diet  Increase physical activity   Latest Reference Range & Units 12/28/22 10:25   Cholesterol <=200 mg/dL 261 (H)   HDL 35 - 60 mg/dL 45   LDL Cholesterol External 50.00 - 140.00 mg/dL 180.00 (H)   Total Cholesterol/HDL Ratio 0 - 5  6 (H)   Triglycerides 37 - 140 mg/dL 180 (H)   Very Low Density Lipoprotein  36

## 2022-12-28 NOTE — PROGRESS NOTES
Subjective:       Patient ID: Rabia Valencia is a 67 y.o. female.    Chief Complaint: Follow-up and Results (Leg pain off and on. /Telemedicine)    Established Patient - Audio Only Telehealth Visit     The patient location is: home  The chief complaint leading to consultation is: follow up on HLD  Visit type: Virtual visit with audio only (telephone)  Total time spent with patient: 15 minutes    The reason for the audio only service rather than synchronous audio and video virtual visit was related to technical difficulties or patient preference/necessity.    Each patient to whom I provide medical services by telemedicine is:  (1) informed of the relationship between the physician and patient and the respective role of any other health care provider with respect to management of the patient; and (2) notified that they may decline to receive medical services by telemedicine and may withdraw from such care at any time. Patient verbally consented to receive this service via voice-only telephone call.     This service was not originating from a related E/M service provided within the previous 7 days nor will  to an E/M service or procedure within the next 24 hours or my soonest available appointment.  Prevailing standard of care was able to be met in this audio-only visit.          Patient has diagnosis of HTN, HLD, Hx of COVID-19. Patient seen per audio visit today for follow up on HLD. Patient last seen in clinic on 08/22/2022. Patient currently on Simvastatin 10 mg daily. Repeat FLP worsened. Patient states she forgets to take her medication.  Patient instructed on importance of taking medication daily as prescribed.  Patient also informed of increased risk of heart attack or stroke due to elevated cholesterol levels.  Patient states understanding.  At previous visit, B/P noted to be 146/88.  Patient started on HCTZ 12.5 mg daily.  Patient states she stopped taking medication because it made her dizzy.  Patient  denies checking blood pressure at home.  Patient denies headaches, blurred vision, chest pain.  Patient was also started on vitamin-D for vitamin-D deficiency, patient does not remember if she took medication or not.     Patient was seen by Neurology Clinic for headaches. Last appointment on 2022. Patient instructed to continue blood pressure management with PCP. Headache education provided - including good sleep hygiene, stress management, medication overuse education provided - using more 3 OTC per week may worsen headaches, High intensity interval training has shown to reduce headache frequency. Low carb, high protein has shown to reduce headache frequency. Patient is instructed to keep headache diary. Patient to follow up as needed.        Mammogram: 05/10/2022, benign, MRI/MMG every 6 months, MRI breast ordered for 11/10/2022  PAP: Hysterectomy  Cologuard: , positive, GI referral ordered  Colonoscopy: scheduled for 2023  Bone Density: 2021, normal  Medicare Wellness: ordered    Review of Systems   Constitutional: Negative.    HENT: Negative.     Eyes: Negative.    Respiratory: Negative.     Cardiovascular: Negative.    Gastrointestinal: Negative.    Endocrine: Negative.    Genitourinary: Negative.    Musculoskeletal: Negative.    Integumentary:  Negative.   Allergic/Immunologic: Negative.    Neurological: Negative.    Hematological: Negative.    Psychiatric/Behavioral: Negative.         Objective:      Physical Exam  Neurological:      Mental Status: She is alert and oriented to person, place, and time.   Psychiatric:         Mood and Affect: Mood normal.       Assessment:       Problem List Items Addressed This Visit          Cardiac/Vascular    Benign hypertension (Chronic)     B/P: deferred due to audio visit  UA Creatinine: 43.9  UA Microalbumin: 29.2  EK2021  Continue Losartan 100 mg daily, Metoprolol Succ 25 mg daily  DASH diet  Encouraged home blood pressure  monitoring         Hyperlipidemia - Primary (Chronic)     Increase Simvastatin to 20 mg daily  Weight loss encouraged  Low fat/high fiber diet  Increase physical activity   Latest Reference Range & Units 12/28/22 10:25   Cholesterol <=200 mg/dL 261 (H)   HDL 35 - 60 mg/dL 45   LDL Cholesterol External 50.00 - 140.00 mg/dL 180.00 (H)   Total Cholesterol/HDL Ratio 0 - 5  6 (H)   Triglycerides 37 - 140 mg/dL 180 (H)   Very Low Density Lipoprotein  36            Relevant Orders    Lipid Panel       Endocrine    Class 3 severe obesity due to excess calories with serious comorbidity and body mass index (BMI) of 40.0 to 44.9 in adult (Chronic)     BMI: 40.46  TSH: 1.66  Vitamin D level: 17.6  HgbA1c: glucose WNL  Weight Loss Encouraged  Increase Physical Activity         Vitamin D deficiency (Chronic)     Vitamin D level: 17.6  Start Vitamin D 50,000 units weekly            Plan:    Patient to follow up in 3 months with labs to be done prior to appointment to reassess HLD.

## 2023-04-06 ENCOUNTER — OFFICE VISIT (OUTPATIENT)
Dept: INTERNAL MEDICINE | Facility: CLINIC | Age: 68
End: 2023-04-06
Payer: MEDICARE

## 2023-04-06 VITALS
HEART RATE: 69 BPM | HEIGHT: 63 IN | WEIGHT: 220.19 LBS | TEMPERATURE: 98 F | SYSTOLIC BLOOD PRESSURE: 119 MMHG | DIASTOLIC BLOOD PRESSURE: 78 MMHG | BODY MASS INDEX: 39.02 KG/M2 | RESPIRATION RATE: 18 BRPM

## 2023-04-06 DIAGNOSIS — M25.562 CHRONIC PAIN OF LEFT KNEE: ICD-10-CM

## 2023-04-06 DIAGNOSIS — G89.29 CHRONIC PAIN OF LEFT ANKLE: ICD-10-CM

## 2023-04-06 DIAGNOSIS — M25.572 CHRONIC PAIN OF LEFT ANKLE: ICD-10-CM

## 2023-04-06 DIAGNOSIS — E55.9 VITAMIN D DEFICIENCY: Chronic | ICD-10-CM

## 2023-04-06 DIAGNOSIS — G89.29 CHRONIC PAIN OF LEFT KNEE: ICD-10-CM

## 2023-04-06 DIAGNOSIS — I10 BENIGN HYPERTENSION: Primary | Chronic | ICD-10-CM

## 2023-04-06 DIAGNOSIS — E78.2 MIXED HYPERLIPIDEMIA: Chronic | ICD-10-CM

## 2023-04-06 DIAGNOSIS — E66.01 CLASS 3 SEVERE OBESITY DUE TO EXCESS CALORIES WITH SERIOUS COMORBIDITY AND BODY MASS INDEX (BMI) OF 40.0 TO 44.9 IN ADULT: Chronic | ICD-10-CM

## 2023-04-06 PROCEDURE — 3074F PR MOST RECENT SYSTOLIC BLOOD PRESSURE < 130 MM HG: ICD-10-PCS | Mod: CPTII,,, | Performed by: NURSE PRACTITIONER

## 2023-04-06 PROCEDURE — 1159F MED LIST DOCD IN RCRD: CPT | Mod: CPTII,,, | Performed by: NURSE PRACTITIONER

## 2023-04-06 PROCEDURE — 99214 PR OFFICE/OUTPT VISIT, EST, LEVL IV, 30-39 MIN: ICD-10-PCS | Mod: S$PBB,,, | Performed by: NURSE PRACTITIONER

## 2023-04-06 PROCEDURE — 3074F SYST BP LT 130 MM HG: CPT | Mod: CPTII,,, | Performed by: NURSE PRACTITIONER

## 2023-04-06 PROCEDURE — 3008F PR BODY MASS INDEX (BMI) DOCUMENTED: ICD-10-PCS | Mod: CPTII,,, | Performed by: NURSE PRACTITIONER

## 2023-04-06 PROCEDURE — 1159F PR MEDICATION LIST DOCUMENTED IN MEDICAL RECORD: ICD-10-PCS | Mod: CPTII,,, | Performed by: NURSE PRACTITIONER

## 2023-04-06 PROCEDURE — 4010F PR ACE/ARB THEARPY RXD/TAKEN: ICD-10-PCS | Mod: CPTII,,, | Performed by: NURSE PRACTITIONER

## 2023-04-06 PROCEDURE — 3008F BODY MASS INDEX DOCD: CPT | Mod: CPTII,,, | Performed by: NURSE PRACTITIONER

## 2023-04-06 PROCEDURE — 1160F RVW MEDS BY RX/DR IN RCRD: CPT | Mod: CPTII,,, | Performed by: NURSE PRACTITIONER

## 2023-04-06 PROCEDURE — 3078F PR MOST RECENT DIASTOLIC BLOOD PRESSURE < 80 MM HG: ICD-10-PCS | Mod: CPTII,,, | Performed by: NURSE PRACTITIONER

## 2023-04-06 PROCEDURE — 3078F DIAST BP <80 MM HG: CPT | Mod: CPTII,,, | Performed by: NURSE PRACTITIONER

## 2023-04-06 PROCEDURE — 1125F PR PAIN SEVERITY QUANTIFIED, PAIN PRESENT: ICD-10-PCS | Mod: CPTII,,, | Performed by: NURSE PRACTITIONER

## 2023-04-06 PROCEDURE — 1160F PR REVIEW ALL MEDS BY PRESCRIBER/CLIN PHARMACIST DOCUMENTED: ICD-10-PCS | Mod: CPTII,,, | Performed by: NURSE PRACTITIONER

## 2023-04-06 PROCEDURE — 99214 OFFICE O/P EST MOD 30 MIN: CPT | Mod: S$PBB,,, | Performed by: NURSE PRACTITIONER

## 2023-04-06 PROCEDURE — 1125F AMNT PAIN NOTED PAIN PRSNT: CPT | Mod: CPTII,,, | Performed by: NURSE PRACTITIONER

## 2023-04-06 PROCEDURE — 4010F ACE/ARB THERAPY RXD/TAKEN: CPT | Mod: CPTII,,, | Performed by: NURSE PRACTITIONER

## 2023-04-06 PROCEDURE — 99215 OFFICE O/P EST HI 40 MIN: CPT | Mod: PBBFAC | Performed by: NURSE PRACTITIONER

## 2023-04-06 RX ORDER — ERGOCALCIFEROL 1.25 MG/1
50000 CAPSULE ORAL
Qty: 12 CAPSULE | Refills: 2 | Status: SHIPPED | OUTPATIENT
Start: 2023-04-06 | End: 2023-10-12 | Stop reason: SDUPTHER

## 2023-04-06 RX ORDER — SIMVASTATIN 20 MG/1
20 TABLET, FILM COATED ORAL NIGHTLY
Qty: 90 TABLET | Refills: 2 | Status: SHIPPED | OUTPATIENT
Start: 2023-04-06 | End: 2023-10-12 | Stop reason: SDUPTHER

## 2023-04-06 RX ORDER — LOSARTAN POTASSIUM 100 MG/1
100 TABLET ORAL DAILY
Qty: 90 TABLET | Refills: 2 | Status: SHIPPED | OUTPATIENT
Start: 2023-04-06 | End: 2023-10-12 | Stop reason: SDUPTHER

## 2023-04-06 RX ORDER — METOPROLOL SUCCINATE 25 MG/1
25 TABLET, EXTENDED RELEASE ORAL DAILY
Qty: 90 TABLET | Refills: 2 | Status: SHIPPED | OUTPATIENT
Start: 2023-04-06 | End: 2023-10-12 | Stop reason: SDUPTHER

## 2023-04-06 NOTE — PROGRESS NOTES
Patient ID: [id number]     Chief Complaint: Foot Pain, Follow-up, and Results (Ankle swelling.)    HPI:     Rabia Valencia is a 67 y.o. female with diagnosis of HTN, HLD, Vitamin D Deficiency, Obesity. Patient seen in clinic today for follow up on HLD. Patient last seen in clinic on 2022.  At previous appt, Simvastatin increased to 20 mg daily for HLD. Patient states taking medication as prescribed, tolerating well. Cholesterol levels improved.   Today, patient states left knee and ankle pain, chronic, intermittent. Patient prescribed Diclofenac gel bid, states minimal relief. Patient denies injury. Patient denies any other acute complaints.      Patient was seen by Neurology Clinic for headaches. Last appointment on 2022. Patient instructed to continue blood pressure management with PCP. Headache education provided - including good sleep hygiene, stress management, medication overuse education provided - using more 3 OTC per week may worsen headaches, High intensity interval training has shown to reduce headache frequency. Low carb, high protein has shown to reduce headache frequency. Patient is instructed to keep headache diary. Patient to follow up as needed.    Review of patient's allergies indicates:   Allergen Reactions    Iodine     Morphine      HIVES    Opioids - morphine analogues      Pt informed post surgery by MD that she is allergic to morphine    Iodine and iodide containing products Rash     Breast Cancer Screenin/10/2022, benign, MRI/MMG every 6 months, MRI breast ordered for 11/10/2022  Cervical Cancer Screening: Hysterectomy  Colorectal Cancer Screening: Cologuard positive on 2022; GI referral ordered for Colonoscopy  Diabetic Eye Exam: N/A  Diabetic Foot Exam: N/A  Lung Cancer Screening: N/A  Prostate Cancer Screening: N/A  AAA Screening: N/A  Osteoporosis Screenin2021, normal  Medicare Wellness: ordered  Immunizations:   Immunization History   Administered Date(s)  Administered    COVID-19, MRNA, JOANN-S, PF (Pfizer) (Purple Cap) 06/15/2021, 07/06/2021     Past Surgical History:   Procedure Laterality Date    BREAST SURGERY       family history includes Breast cancer in her sister; Cancer in her father; Hypertension in her mother.    Social History     Socioeconomic History    Marital status: Single    Number of children: 2   Tobacco Use    Smoking status: Never    Smokeless tobacco: Never   Substance and Sexual Activity    Alcohol use: Never    Drug use: Never    Sexual activity: Not Currently     Partners: Male     Social Determinants of Health     Financial Resource Strain: Low Risk     Difficulty of Paying Living Expenses: Not hard at all   Food Insecurity: No Food Insecurity    Worried About Running Out of Food in the Last Year: Never true    Ran Out of Food in the Last Year: Never true   Transportation Needs: No Transportation Needs    Lack of Transportation (Medical): No    Lack of Transportation (Non-Medical): No   Physical Activity: Inactive    Days of Exercise per Week: 0 days    Minutes of Exercise per Session: 0 min   Stress: No Stress Concern Present    Feeling of Stress : Only a little   Social Connections: Moderately Isolated    Frequency of Communication with Friends and Family: Three times a week    Frequency of Social Gatherings with Friends and Family: Once a week    Attends Voodoo Services: 1 to 4 times per year    Active Member of Clubs or Organizations: No    Attends Club or Organization Meetings: Never    Marital Status: Never    Housing Stability: Low Risk     Unable to Pay for Housing in the Last Year: No    Number of Places Lived in the Last Year: 1    Unstable Housing in the Last Year: No     Current Outpatient Medications   Medication Instructions    diclofenac sodium (VOLTAREN) 2 g, Topical (Top), 2 times daily PRN    ergocalciferol (ERGOCALCIFEROL) 50,000 Units, Oral, Every 7 days    losartan (COZAAR) 100 mg, Oral, Daily    metoprolol  "succinate (TOPROL-XL) 25 mg, Oral, Daily    simvastatin (ZOCOR) 20 mg, Oral, Nightly    valACYclovir (VALTREX) 1,000 mg, Oral, Daily       Subjective:     Review of Systems   Constitutional: Negative.    HENT: Negative.     Eyes: Negative.    Respiratory: Negative.     Cardiovascular: Negative.    Gastrointestinal: Negative.    Endocrine: Negative.    Genitourinary: Negative.    Musculoskeletal:  Positive for arthralgias.   Skin: Negative.    Allergic/Immunologic: Negative.    Neurological: Negative.    Hematological: Negative.    Psychiatric/Behavioral: Negative.       Objective:     Visit Vitals  /78 (BP Location: Left arm, Patient Position: Sitting, BP Method: Large (Automatic))   Pulse 69   Temp 97.7 °F (36.5 °C) (Oral)   Resp 18   Ht 5' 2.99" (1.6 m)   Wt 99.9 kg (220 lb 3.2 oz)   BMI 39.02 kg/m²       Physical Exam  Vitals reviewed.   Constitutional:       Appearance: Normal appearance.   HENT:      Head: Normocephalic and atraumatic.      Mouth/Throat:      Mouth: Mucous membranes are moist.      Pharynx: Oropharynx is clear.   Eyes:      Extraocular Movements: Extraocular movements intact.      Conjunctiva/sclera: Conjunctivae normal.      Pupils: Pupils are equal, round, and reactive to light.   Cardiovascular:      Rate and Rhythm: Normal rate and regular rhythm.      Heart sounds: Normal heart sounds.   Pulmonary:      Effort: Pulmonary effort is normal.      Breath sounds: Normal breath sounds.   Abdominal:      General: Bowel sounds are normal.   Musculoskeletal:         General: Normal range of motion.      Cervical back: Normal range of motion.   Skin:     General: Skin is warm and dry.   Neurological:      Mental Status: She is alert and oriented to person, place, and time.   Psychiatric:         Mood and Affect: Mood normal.         Behavior: Behavior normal.       Labs Reviewed:     Hematology:  Lab Results   Component Value Date    WBC 6.6 12/28/2022    HGB 13.0 12/28/2022    HCT 41.9 " 12/28/2022     12/28/2022     Chemistry:  Lab Results   Component Value Date     12/28/2022    K 4.0 12/28/2022    CHLORIDE 108 (H) 12/28/2022    BUN 11.6 12/28/2022    CREATININE 0.90 12/28/2022    EGFRNORACEVR >60 12/28/2022    GLUCOSE 99 12/28/2022    CALCIUM 9.9 12/28/2022    ALKPHOS 116 12/28/2022    LABPROT 7.9 (H) 12/28/2022    ALBUMIN 3.8 12/28/2022    BILIDIR 0.2 05/02/2022    IBILI 0.20 05/02/2022    AST 14 12/28/2022    ALT 12 12/28/2022    BIBQECTQ82ZE 17.6 (L) 12/28/2022     Lab Results   Component Value Date    HGBA1C 5.7 10/18/2021     Lipid Panel:  Lab Results   Component Value Date    CHOL 183 04/06/2023    HDL 53 04/06/2023    .00 04/06/2023    TRIG 117 04/06/2023    TOTALCHOLEST 3 04/06/2023     Thyroid:  Lab Results   Component Value Date    TSH 1.6622 08/22/2022     Urine:  Lab Results   Component Value Date    COLORUA Colorless 08/22/2022    APPEARANCEUA Clear 08/22/2022    SGUA 1.009 08/22/2022    PHUA 7.5 08/22/2022    PROTEINUA Negative 08/22/2022    GLUCOSEUA Normal 08/22/2022    KETONESUA Negative 08/22/2022    BLOODUA Negative 08/22/2022    NITRITESUA Negative 08/22/2022    LEUKOCYTESUR Negative 08/22/2022    RBCUA 0-5 08/22/2022    WBCUA 0-5 08/22/2022    BACTERIA Moderate (A) 08/22/2022    SQEPUA Trace (A) 08/22/2022    HYALINECASTS None Seen 08/22/2022    CREATRANDUR 43.9 (L) 08/22/2022        Assessment:       ICD-10-CM ICD-9-CM   1. Benign hypertension  I10 401.1   2. Mixed hyperlipidemia  E78.2 272.2   3. Chronic pain of left knee  M25.562 719.46    G89.29 338.29   4. Chronic pain of left ankle  M25.572 719.47    G89.29 338.29   5. Vitamin D deficiency  E55.9 268.9   6. Class 3 severe obesity due to excess calories with serious comorbidity and body mass index (BMI) of 40.0 to 44.9 in adult  E66.01 278.01    Z68.41 V85.41        Plan:     1. Benign hypertension  Vitals:    04/06/23 1336   BP: 119/78   Pulse: 69   Resp: 18   Temp: 97.7 °F (36.5 °C)      Urine  Creatinine   Date Value Ref Range Status   08/22/2022 43.9 (L) 47.0 - 110.0 mg/dL Final     No results found for: MICROALBUR   Results for orders placed or performed during the hospital encounter of 11/15/22   EKG 12-lead (Weakness) Age > 50    Collection Time: 11/15/22  8:57 AM    Narrative    Test Reason : R53.1,    Vent. Rate : 063 BPM     Atrial Rate : 063 BPM     P-R Int : 138 ms          QRS Dur : 090 ms      QT Int : 390 ms       P-R-T Axes : 023 023 -10 degrees     QTc Int : 399 ms    Normal sinus rhythm  Minimal voltage criteria for LVH, may be normal variant  Borderline Abnormal ECG  When compared with ECG of 02-MAY-2022 08:31,  T wave inversion now evident in Inferior leads  Confirmed by Nishant Mccarthy MD ([id number]) on 11/15/2022 9:21:04 AM    Referred By: [email or web address]   SELF           Confirmed By:Nishant Mccarthy MD   Continue Losartan 100 mg daily, Metoprolol Succ 25 mg daily  DASH diet  Encouraged home blood pressure monitoring    2. Mixed hyperlipidemia  Continue Simvastatin 20 mg daily  Weight loss encouraged  Low fat/high fiber diet  Increase physical activity  Cholesterol Total   Date Value Ref Range Status   04/06/2023 183 <=200 mg/dL Final     HDL Cholesterol   Date Value Ref Range Status   04/06/2023 53 35 - 60 mg/dL Final     Triglyceride   Date Value Ref Range Status   04/06/2023 117 37 - 140 mg/dL Final     LDL Cholesterol   Date Value Ref Range Status   04/06/2023 107.00 50.00 - 140.00 mg/dL Final     3. Chronic pain of left knee  Continue Diclofenac gel  - X-Ray Knee 3 View Left; Future    4. Chronic pain of left ankle  Continue Diclofenac gel  - X-Ray Ankle 2 View Left; Future    5. Vitamin D deficiency  Vitamin D level: 17.6  Continue vitamin D 50,000 units weekly    6. Class 3 severe obesity due to excess calories with serious comorbidity and body mass index (BMI) of 40.0 to 44.9 in adult  Body mass index is 39.02 kg/m².  Thyroid Stimulating Hormone   Date Value Ref Range Status   08/22/2022  1.6622 0.3500 - 4.9400 uIU/mL Final     Vit D 25 OH   Date Value Ref Range Status   12/28/2022 17.6 (L) 30.0 - 80.0 ng/mL Final     Hemoglobin A1c   Date Value Ref Range Status   10/18/2021 5.7 <<=7.0 % Final   Sleep Study: none noted  Weight Loss Encouraged  Increase Physical Activity      Follow up in about 6 months (around 10/6/2023) for Labs. In addition to their scheduled follow up, the patient has also been instructed to follow up on as needed basis.     Adia Elam, MARY

## 2023-04-11 ENCOUNTER — TELEPHONE (OUTPATIENT)
Dept: GASTROENTEROLOGY | Facility: CLINIC | Age: 68
End: 2023-04-11
Payer: MEDICARE

## 2023-05-22 ENCOUNTER — HOSPITAL ENCOUNTER (EMERGENCY)
Facility: HOSPITAL | Age: 68
Discharge: HOME | End: 2023-05-22
Attending: STUDENT IN AN ORGANIZED HEALTH CARE EDUCATION/TRAINING PROGRAM
Payer: MEDICARE

## 2023-05-22 VITALS
WEIGHT: 217.13 LBS | SYSTOLIC BLOOD PRESSURE: 158 MMHG | RESPIRATION RATE: 18 BRPM | HEIGHT: 64 IN | HEART RATE: 63 BPM | DIASTOLIC BLOOD PRESSURE: 104 MMHG | OXYGEN SATURATION: 95 % | BODY MASS INDEX: 37.07 KG/M2 | TEMPERATURE: 98 F

## 2023-05-22 DIAGNOSIS — M79.605 LEFT LEG PAIN: ICD-10-CM

## 2023-05-22 DIAGNOSIS — M17.12 ARTHRITIS OF LEFT KNEE: Primary | ICD-10-CM

## 2023-05-22 PROCEDURE — 63600175 PHARM REV CODE 636 W HCPCS: Performed by: PHYSICIAN ASSISTANT

## 2023-05-22 PROCEDURE — 99284 EMERGENCY DEPT VISIT MOD MDM: CPT

## 2023-05-22 PROCEDURE — 96372 THER/PROPH/DIAG INJ SC/IM: CPT | Performed by: PHYSICIAN ASSISTANT

## 2023-05-22 RX ORDER — METHYLPREDNISOLONE 4 MG/1
TABLET ORAL
Qty: 1 EACH | Refills: 0 | Status: SHIPPED | OUTPATIENT
Start: 2023-05-22 | End: 2023-10-12

## 2023-05-22 RX ORDER — DICLOFENAC SODIUM 75 MG/1
75 TABLET, DELAYED RELEASE ORAL 2 TIMES DAILY
Qty: 20 TABLET | Refills: 0 | Status: SHIPPED | OUTPATIENT
Start: 2023-05-22 | End: 2023-10-12

## 2023-05-22 RX ORDER — KETOROLAC TROMETHAMINE 30 MG/ML
30 INJECTION, SOLUTION INTRAMUSCULAR; INTRAVENOUS
Status: COMPLETED | OUTPATIENT
Start: 2023-05-22 | End: 2023-05-22

## 2023-05-22 RX ADMIN — KETOROLAC TROMETHAMINE 30 MG: 30 INJECTION, SOLUTION INTRAMUSCULAR; INTRAVENOUS at 08:05

## 2023-05-22 NOTE — ED PROVIDER NOTES
Encounter Date: 5/22/2023       History     Chief Complaint   Patient presents with    Leg Pain     Left leg pain for >1 year worse at night.      68 YO AAF in ER With complaints of chronic left leg/knee and ankle pain. Worse after being on her feet for 3-4 hours and keeping her from sleeping at night. Denies injury/trauma. Denies leg swelling, fever, chills, chest pain, SOB, abdominal pain, N/V/D, HA or dizziness. No other complaints.     The history is provided by the patient.   Review of patient's allergies indicates:   Allergen Reactions    Iodine     Morphine      HIVES    Opioids - morphine analogues      Pt informed post surgery by MD that she is allergic to morphine    Iodine and iodide containing products Rash     Past Medical History:   Diagnosis Date    Hyperlipidemia     Hypertension      Past Surgical History:   Procedure Laterality Date    BREAST SURGERY       Family History   Problem Relation Age of Onset    Hypertension Mother     Cancer Father     Breast cancer Sister      Social History     Tobacco Use    Smoking status: Never    Smokeless tobacco: Never   Substance Use Topics    Alcohol use: Never    Drug use: Never     Review of Systems   Constitutional:  Negative for fever.   HENT:  Negative for sore throat.    Respiratory:  Negative for shortness of breath.    Cardiovascular:  Negative for chest pain.   Gastrointestinal:  Negative for nausea.   Genitourinary:  Negative for dysuria.   Musculoskeletal:  Positive for arthralgias. Negative for back pain and joint swelling.   Skin:  Negative for rash.   Neurological:  Negative for weakness.   Hematological:  Does not bruise/bleed easily.   All other systems reviewed and are negative.    Physical Exam     Initial Vitals [05/22/23 0823]   BP Pulse Resp Temp SpO2   (!) 158/104 63 18 97.9 °F (36.6 °C) 95 %      MAP       --         Physical Exam    Nursing note and vitals reviewed.  Constitutional: She appears well-developed and well-nourished. She is  not diaphoretic. No distress.   HENT:   Head: Normocephalic and atraumatic.   Nose: Nose normal.   Eyes: Conjunctivae are normal.   Cardiovascular:  Normal rate, regular rhythm and intact distal pulses.           Pulmonary/Chest: Breath sounds normal.   Musculoskeletal:         General: Normal range of motion.      Left knee: Crepitus present. No swelling, deformity or effusion. Normal range of motion. Tenderness present over the medial joint line and lateral joint line. Normal pulse.     Neurological: She is alert and oriented to person, place, and time. She has normal strength.   Skin: Skin is warm and dry.   Psychiatric: She has a normal mood and affect.       ED Course   Procedures  Labs Reviewed - No data to display       Imaging Results    None          Medications   ketorolac injection 30 mg (30 mg Intramuscular Given 5/22/23 0839)                 ED Course as of 05/22/23 0930   Mon May 22, 2023   0929 VSS, NAD, pt is non-toxic or ill appearing, imaging not indicated at this due since no trauma has occurred, pt to follow up with her PCP, will treat for DJD, treatment plan and discharge instructions including follow up discussed, pt verbalized understanding, all questions answered, pt is stable and ready for discharge  [TT]      ED Course User Index  [TT] SRINI Douglass                 Clinical Impression:   Final diagnoses:  [M17.12] Arthritis of left knee (Primary)  [M79.605] Left leg pain        ED Disposition Condition    Discharge Stable          ED Prescriptions       Medication Sig Dispense Start Date End Date Auth. Provider    diclofenac (VOLTAREN) 75 MG EC tablet Take 1 tablet (75 mg total) by mouth 2 (two) times daily. 20 tablet 5/22/2023 -- SRINI Douglass    methylPREDNISolone (MEDROL DOSEPACK) 4 mg tablet Take as package instructs 1 each 5/22/2023 -- SRINI Douglass          Follow-up Information       Follow up With Specialties Details Why Contact Info    [facility]  Schedule an appointment as soon as possible for a visit in 3 days  [address] [facility] [address]  [phone]      [facility] Emergency Medicine In 3 days As needed, If symptoms worsen [address] [facility] [id number] [phone]             SRINI Douglass  05/22/23 0911

## 2023-06-29 ENCOUNTER — OFFICE VISIT (OUTPATIENT)
Dept: GYNECOLOGY | Facility: CLINIC | Age: 68
End: 2023-06-29
Payer: MEDICARE

## 2023-06-29 VITALS
DIASTOLIC BLOOD PRESSURE: 82 MMHG | RESPIRATION RATE: 20 BRPM | SYSTOLIC BLOOD PRESSURE: 142 MMHG | HEART RATE: 60 BPM | WEIGHT: 218.19 LBS | BODY MASS INDEX: 37.25 KG/M2 | HEIGHT: 64 IN | OXYGEN SATURATION: 100 % | TEMPERATURE: 98 F

## 2023-06-29 DIAGNOSIS — Z01.419 ENCOUNTER FOR ANNUAL ROUTINE GYNECOLOGICAL EXAMINATION: Primary | ICD-10-CM

## 2023-06-29 DIAGNOSIS — N81.9 FEMALE GENITAL PROLAPSE, UNSPECIFIED TYPE: ICD-10-CM

## 2023-06-29 DIAGNOSIS — E66.9 OBESITY, UNSPECIFIED CLASSIFICATION, UNSPECIFIED OBESITY TYPE, UNSPECIFIED WHETHER SERIOUS COMORBIDITY PRESENT: ICD-10-CM

## 2023-06-29 PROCEDURE — 99214 OFFICE O/P EST MOD 30 MIN: CPT | Mod: PBBFAC,25 | Performed by: NURSE PRACTITIONER

## 2023-06-29 PROCEDURE — 3008F PR BODY MASS INDEX (BMI) DOCUMENTED: ICD-10-PCS | Mod: CPTII,,, | Performed by: NURSE PRACTITIONER

## 2023-06-29 PROCEDURE — 1101F PR PT FALLS ASSESS DOC 0-1 FALLS W/OUT INJ PAST YR: ICD-10-PCS | Mod: CPTII,,, | Performed by: NURSE PRACTITIONER

## 2023-06-29 PROCEDURE — 3288F PR FALLS RISK ASSESSMENT DOCUMENTED: ICD-10-PCS | Mod: CPTII,,, | Performed by: NURSE PRACTITIONER

## 2023-06-29 PROCEDURE — 4010F PR ACE/ARB THEARPY RXD/TAKEN: ICD-10-PCS | Mod: CPTII,,, | Performed by: NURSE PRACTITIONER

## 2023-06-29 PROCEDURE — 4010F ACE/ARB THERAPY RXD/TAKEN: CPT | Mod: CPTII,,, | Performed by: NURSE PRACTITIONER

## 2023-06-29 PROCEDURE — G0101 PR CA SCREEN;PELVIC/BREAST EXAM: ICD-10-PCS | Mod: S$PBB,,, | Performed by: NURSE PRACTITIONER

## 2023-06-29 PROCEDURE — 1101F PT FALLS ASSESS-DOCD LE1/YR: CPT | Mod: CPTII,,, | Performed by: NURSE PRACTITIONER

## 2023-06-29 PROCEDURE — 1159F MED LIST DOCD IN RCRD: CPT | Mod: CPTII,,, | Performed by: NURSE PRACTITIONER

## 2023-06-29 PROCEDURE — 3288F FALL RISK ASSESSMENT DOCD: CPT | Mod: CPTII,,, | Performed by: NURSE PRACTITIONER

## 2023-06-29 PROCEDURE — G0101 CA SCREEN;PELVIC/BREAST EXAM: HCPCS | Mod: S$PBB,,, | Performed by: NURSE PRACTITIONER

## 2023-06-29 PROCEDURE — G0101 CA SCREEN;PELVIC/BREAST EXAM: HCPCS | Mod: PBBFAC | Performed by: NURSE PRACTITIONER

## 2023-06-29 PROCEDURE — 1159F PR MEDICATION LIST DOCUMENTED IN MEDICAL RECORD: ICD-10-PCS | Mod: CPTII,,, | Performed by: NURSE PRACTITIONER

## 2023-06-29 PROCEDURE — 3008F BODY MASS INDEX DOCD: CPT | Mod: CPTII,,, | Performed by: NURSE PRACTITIONER

## 2023-06-29 PROCEDURE — 3077F PR MOST RECENT SYSTOLIC BLOOD PRESSURE >= 140 MM HG: ICD-10-PCS | Mod: CPTII,,, | Performed by: NURSE PRACTITIONER

## 2023-06-29 PROCEDURE — 3079F DIAST BP 80-89 MM HG: CPT | Mod: CPTII,,, | Performed by: NURSE PRACTITIONER

## 2023-06-29 PROCEDURE — 3079F PR MOST RECENT DIASTOLIC BLOOD PRESSURE 80-89 MM HG: ICD-10-PCS | Mod: CPTII,,, | Performed by: NURSE PRACTITIONER

## 2023-06-29 PROCEDURE — 3077F SYST BP >= 140 MM HG: CPT | Mod: CPTII,,, | Performed by: NURSE PRACTITIONER

## 2023-06-29 NOTE — PROGRESS NOTES
"  Subjective:       Patient ID: Rabia Valencia is a 67 y.o. female.    Chief Complaint:  Gynecologic Exam      History of Present Illness  The patient is  here for annual exam. Denies history of abnormal paps. Pt has had total hysterectomy at 37 yo for fibroids. Last MG-5/10/22 & BIRADS 2. Hx of Lt breast bx. Denies breast complaints. Previous pt of uro/gyn for enterocele/rectocele. She reports that she has used a pessary many years ago secondary to prolapse but has been many years since last use. Pt had plans for surgery in 2017, however did not proceed. Pt no showed for Uro/gyn appt in , , . Today, admits to some pelvic pressure. Denies any hot flashes. Denies tobacco use. Admits fly hx of breast and colon cancer in sisters. Denies uterine and ovarian cancer. DEXA WNL in . Positive FIT, pt has cancelled colonoscopy.    GYN & OB History  No LMP recorded. Patient is postmenopausal.       Review of patient's allergies indicates:   Allergen Reactions    Iodine     Morphine      HIVES    Opioids - morphine analogues      Pt informed post surgery by MD that she is allergic to morphine    Iodine and iodide containing products Rash     Past Medical History:   Diagnosis Date    Hyperlipidemia     Hypertension      OB History    Para Term  AB Living   2 2           SAB IAB Ectopic Multiple Live Births                  # Outcome Date GA Lbr Jagdish/2nd Weight Sex Delivery Anes PTL Lv   2 Para            1 Para                 Review of Systems  Review of Systems    Negative except for pertinent findings for positives per HPI     Objective:    Physical Exam    BP (!) 142/82 (BP Location: Right arm, Patient Position: Sitting, BP Method: Medium (Automatic))   Pulse 60   Temp 97.7 °F (36.5 °C) (Oral)   Resp 20   Ht 5' 4" (1.626 m)   Wt 99 kg (218 lb 3.2 oz)   SpO2 100%   BMI 37.45 kg/m²   GENERAL: Well-developed female in no acute distress.  SKIN: Normal to inspection,warm, dry and " intact.  BREASTS: No masses, lumps, discharge, tenderness.  VULVA: General appearance WNL; prolapse noted at introitus  BIMANUAL EXAM: Atrophic vaginal mucosa,Uterus/Cervix surgically absent. Ovaries surgically absent. Limited exam d/t body habitus.  PSYCHIATRIC: Patient is oriented to person, place, and time. Mood and affect are normal.    Assessment:       1. Encounter for annual routine gynecological examination    2. Female genital prolapse, unspecified type    3. Obesity, unspecified classification, unspecified obesity type, unspecified whether serious comorbidity present       Plan:   Rabia was seen today for gynecologic exam.    Diagnoses and all orders for this visit:    Encounter for annual routine gynecological examination    Female genital prolapse, unspecified type  -     Ambulatory referral/consult to Urogynecology; Future    Obesity, unspecified classification, unspecified obesity type, unspecified whether serious comorbidity present    Pelvic today, pap deferred d/t hysterectomy  Referral to uro[facility]gyn [facility]  Reschedule colonoscopy, given phone number to schedule.  Healthy lifestyle choices. Importance of maintaining a healthy BMI. Healthy diet including limiting fast foods, processed foods, foods containing high amounts of saturated fats or high sodium content. Recommend low carb, low fat diet rich in lean meat and fish, non starchy vegetables, fruits and good fat while maintaining portion control. Limit sugar intake. Recommended plenty of water, avoiding carbonated beverages and high fructose corn syrup. Regular cardiovascular exercise, at least 150 minutes of cardiovascular exercise (at least 30 mins 5x/week).  Follow up in about 1 year (around 6/29/2024) for annual exam.

## 2023-07-12 ENCOUNTER — TELEPHONE (OUTPATIENT)
Dept: UROGYNECOLOGY | Facility: CLINIC | Age: 68
End: 2023-07-12
Payer: MEDICARE

## 2023-10-12 ENCOUNTER — OFFICE VISIT (OUTPATIENT)
Dept: INTERNAL MEDICINE | Facility: CLINIC | Age: 68
End: 2023-10-12
Payer: MEDICARE

## 2023-10-12 VITALS
DIASTOLIC BLOOD PRESSURE: 85 MMHG | SYSTOLIC BLOOD PRESSURE: 137 MMHG | TEMPERATURE: 98 F | RESPIRATION RATE: 18 BRPM | HEART RATE: 52 BPM | HEIGHT: 64 IN | BODY MASS INDEX: 36.88 KG/M2 | WEIGHT: 216 LBS

## 2023-10-12 DIAGNOSIS — E78.2 MIXED HYPERLIPIDEMIA: Chronic | ICD-10-CM

## 2023-10-12 DIAGNOSIS — I10 BENIGN HYPERTENSION: Primary | Chronic | ICD-10-CM

## 2023-10-12 DIAGNOSIS — E66.01 CLASS 2 SEVERE OBESITY DUE TO EXCESS CALORIES WITH SERIOUS COMORBIDITY AND BODY MASS INDEX (BMI) OF 37.0 TO 37.9 IN ADULT: Chronic | ICD-10-CM

## 2023-10-12 DIAGNOSIS — Z12.11 ENCOUNTER FOR COLORECTAL CANCER SCREENING: ICD-10-CM

## 2023-10-12 DIAGNOSIS — Z12.12 ENCOUNTER FOR COLORECTAL CANCER SCREENING: ICD-10-CM

## 2023-10-12 DIAGNOSIS — Z12.39 ENCOUNTER FOR BREAST CANCER SCREENING USING NON-MAMMOGRAM MODALITY: ICD-10-CM

## 2023-10-12 PROCEDURE — 4010F ACE/ARB THERAPY RXD/TAKEN: CPT | Mod: CPTII,,, | Performed by: NURSE PRACTITIONER

## 2023-10-12 PROCEDURE — 1126F PR PAIN SEVERITY QUANTIFIED, NO PAIN PRESENT: ICD-10-PCS | Mod: CPTII,,, | Performed by: NURSE PRACTITIONER

## 2023-10-12 PROCEDURE — 1159F PR MEDICATION LIST DOCUMENTED IN MEDICAL RECORD: ICD-10-PCS | Mod: CPTII,,, | Performed by: NURSE PRACTITIONER

## 2023-10-12 PROCEDURE — 3288F PR FALLS RISK ASSESSMENT DOCUMENTED: ICD-10-PCS | Mod: CPTII,,, | Performed by: NURSE PRACTITIONER

## 2023-10-12 PROCEDURE — 1160F PR REVIEW ALL MEDS BY PRESCRIBER/CLIN PHARMACIST DOCUMENTED: ICD-10-PCS | Mod: CPTII,,, | Performed by: NURSE PRACTITIONER

## 2023-10-12 PROCEDURE — 1101F PT FALLS ASSESS-DOCD LE1/YR: CPT | Mod: CPTII,,, | Performed by: NURSE PRACTITIONER

## 2023-10-12 PROCEDURE — 3079F PR MOST RECENT DIASTOLIC BLOOD PRESSURE 80-89 MM HG: ICD-10-PCS | Mod: CPTII,,, | Performed by: NURSE PRACTITIONER

## 2023-10-12 PROCEDURE — 3008F PR BODY MASS INDEX (BMI) DOCUMENTED: ICD-10-PCS | Mod: CPTII,,, | Performed by: NURSE PRACTITIONER

## 2023-10-12 PROCEDURE — 99215 OFFICE O/P EST HI 40 MIN: CPT | Mod: PBBFAC | Performed by: NURSE PRACTITIONER

## 2023-10-12 PROCEDURE — 3062F POS MACROALBUMINURIA REV: CPT | Mod: CPTII,,, | Performed by: NURSE PRACTITIONER

## 2023-10-12 PROCEDURE — 3066F NEPHROPATHY DOC TX: CPT | Mod: CPTII,,, | Performed by: NURSE PRACTITIONER

## 2023-10-12 PROCEDURE — 1101F PR PT FALLS ASSESS DOC 0-1 FALLS W/OUT INJ PAST YR: ICD-10-PCS | Mod: CPTII,,, | Performed by: NURSE PRACTITIONER

## 2023-10-12 PROCEDURE — 99214 PR OFFICE/OUTPT VISIT, EST, LEVL IV, 30-39 MIN: ICD-10-PCS | Mod: S$PBB,,, | Performed by: NURSE PRACTITIONER

## 2023-10-12 PROCEDURE — 3062F PR POS MACROALBUMINURIA RESULT DOCUMENTED/REVIEW: ICD-10-PCS | Mod: CPTII,,, | Performed by: NURSE PRACTITIONER

## 2023-10-12 PROCEDURE — 3288F FALL RISK ASSESSMENT DOCD: CPT | Mod: CPTII,,, | Performed by: NURSE PRACTITIONER

## 2023-10-12 PROCEDURE — 99214 OFFICE O/P EST MOD 30 MIN: CPT | Mod: S$PBB,,, | Performed by: NURSE PRACTITIONER

## 2023-10-12 PROCEDURE — 3066F PR DOCUMENTATION OF TREATMENT FOR NEPHROPATHY: ICD-10-PCS | Mod: CPTII,,, | Performed by: NURSE PRACTITIONER

## 2023-10-12 PROCEDURE — 4010F PR ACE/ARB THEARPY RXD/TAKEN: ICD-10-PCS | Mod: CPTII,,, | Performed by: NURSE PRACTITIONER

## 2023-10-12 PROCEDURE — 3075F PR MOST RECENT SYSTOLIC BLOOD PRESS GE 130-139MM HG: ICD-10-PCS | Mod: CPTII,,, | Performed by: NURSE PRACTITIONER

## 2023-10-12 PROCEDURE — 3008F BODY MASS INDEX DOCD: CPT | Mod: CPTII,,, | Performed by: NURSE PRACTITIONER

## 2023-10-12 PROCEDURE — 3079F DIAST BP 80-89 MM HG: CPT | Mod: CPTII,,, | Performed by: NURSE PRACTITIONER

## 2023-10-12 PROCEDURE — 3075F SYST BP GE 130 - 139MM HG: CPT | Mod: CPTII,,, | Performed by: NURSE PRACTITIONER

## 2023-10-12 PROCEDURE — 1159F MED LIST DOCD IN RCRD: CPT | Mod: CPTII,,, | Performed by: NURSE PRACTITIONER

## 2023-10-12 PROCEDURE — 1160F RVW MEDS BY RX/DR IN RCRD: CPT | Mod: CPTII,,, | Performed by: NURSE PRACTITIONER

## 2023-10-12 PROCEDURE — 1126F AMNT PAIN NOTED NONE PRSNT: CPT | Mod: CPTII,,, | Performed by: NURSE PRACTITIONER

## 2023-10-12 RX ORDER — SIMVASTATIN 20 MG/1
20 TABLET, FILM COATED ORAL NIGHTLY
Qty: 90 TABLET | Refills: 2 | Status: SHIPPED | OUTPATIENT
Start: 2023-10-12

## 2023-10-12 RX ORDER — LOSARTAN POTASSIUM 100 MG/1
100 TABLET ORAL DAILY
Qty: 90 TABLET | Refills: 2 | Status: SHIPPED | OUTPATIENT
Start: 2023-10-12

## 2023-10-12 RX ORDER — METOPROLOL SUCCINATE 25 MG/1
25 TABLET, EXTENDED RELEASE ORAL DAILY
Qty: 90 TABLET | Refills: 2 | Status: SHIPPED | OUTPATIENT
Start: 2023-10-12

## 2023-10-12 RX ORDER — ERGOCALCIFEROL 1.25 MG/1
50000 CAPSULE ORAL
Qty: 12 CAPSULE | Refills: 2 | Status: SHIPPED | OUTPATIENT
Start: 2023-10-12

## 2023-10-12 NOTE — PROGRESS NOTES
Patient ID: [id number]     Chief Complaint: Follow-up (reults)    HPI:     Rabia Valencia is a 68 y.o. female with diagnosis of HTN, HLD, Vitamin D Deficiency, Obesity, Hysterectomy. Patient seen in clinic today for follow up. Patient last seen in clinic on 2023.  Patient currently prescribed Simvastatin 20 mg daily for HLD. Patient states taking medication as prescribed, tolerating well.   Patient currently prescribed Losartan 100 mg daily, Metoprolol Succ 25 mg daily for HTN. B/P today 137/85. Patient denies chest pain, SOB.   MRI Breast was ordered due to mammogram recommendations, patient never scheduled appointment. Patient had mammogram scheduled for 2023 but patient was a No Show.   Patient had a positive Cologuard, Colonoscopy was scheduled for 2023 but patient canceled.  Patient denies any acute complaints.     Patient followed by GYN Clinic. Last appointment on 2023. Patient has follow up appointment scheduled for 2024     Patient was seen by Neurology Clinic for headaches. Last appointment on 2022. Patient instructed to continue blood pressure management with PCP. Headache education provided - including good sleep hygiene, stress management, medication overuse education provided - using more 3 OTC per week may worsen headaches, High intensity interval training has shown to reduce headache frequency. Low carb, high protein has shown to reduce headache frequency. Patient is instructed to keep headache diary. Patient to follow up as needed.    Review of patient's allergies indicates:   Allergen Reactions    Iodine     Morphine      HIVES    Opioids - morphine analogues      Pt informed post surgery by MD that she is allergic to morphine    Iodine and iodide containing products Rash     Breast Cancer Screenin/10/2022, benign, MRI/MMG every 6 months, MRI breast ordered for 11/10/2022  Cervical Cancer Screening: Hysterectomy  Colorectal Cancer Screening: Cologuard positive  on 2022; GI referral ordered for Colonoscopy  Diabetic Eye Exam: N/A  Diabetic Foot Exam: N/A  Lung Cancer Screening: N/A  Prostate Cancer Screening: N/A  AAA Screening: N/A  Osteoporosis Screenin2021, normal  Medicare Wellness: ordered  Immunizations:   Immunization History   Administered Date(s) Administered    COVID-19, MRNA, LN-S, PF (Pfizer) (Purple Cap) 06/15/2021, 2021     Past Surgical History:   Procedure Laterality Date    BREAST SURGERY       family history includes Breast cancer in her sister; Cancer in her father; Hypertension in her mother.    Social History     Socioeconomic History    Marital status: Single    Number of children: 2   Tobacco Use    Smoking status: Never    Smokeless tobacco: Never   Substance and Sexual Activity    Alcohol use: Never    Drug use: Never    Sexual activity: Not Currently     Partners: Male     Social Determinants of Health     Financial Resource Strain: Low Risk  (2023)    Overall Financial Resource Strain (CARDIA)     Difficulty of Paying Living Expenses: Not hard at all   Food Insecurity: No Food Insecurity (2023)    Hunger Vital Sign     Worried About Running Out of Food in the Last Year: Never true     Ran Out of Food in the Last Year: Never true   Transportation Needs: No Transportation Needs (2023)    PRAPARE - Transportation     Lack of Transportation (Medical): No     Lack of Transportation (Non-Medical): No   Physical Activity: Inactive (2023)    Exercise Vital Sign     Days of Exercise per Week: 0 days     Minutes of Exercise per Session: 0 min   Stress: No Stress Concern Present (2023)    [employer] - Occupational Stress Questionnaire     Feeling of Stress : Only a little   Social Connections: Moderately Isolated (2023)    Social Connection and Isolation Panel [NHANES]     Frequency of Communication with Friends and Family: Three times a week     Frequency of Social Gatherings with  "Friends and Family: Once a week     Attends Jewish Services: 1 to 4 times per year     Active Member of Clubs or Organizations: No     Attends Club or Organization Meetings: Never     Marital Status: Never    Housing Stability: Low Risk  (4/6/2023)    Housing Stability Vital Sign     Unable to Pay for Housing in the Last Year: No     Number of Places Lived in the Last Year: 1     Unstable Housing in the Last Year: No     Current Outpatient Medications   Medication Instructions    ergocalciferol (ERGOCALCIFEROL) 50,000 Units, Oral, Every 7 days    losartan (COZAAR) 100 mg, Oral, Daily    metoprolol succinate (TOPROL-XL) 25 mg, Oral, Daily    simvastatin (ZOCOR) 20 mg, Oral, Nightly       Subjective:     Review of Systems   Constitutional: Negative.    HENT: Negative.     Eyes: Negative.    Respiratory: Negative.     Cardiovascular: Negative.    Gastrointestinal: Negative.    Endocrine: Negative.    Genitourinary: Negative.    Musculoskeletal: Negative.    Skin: Negative.    Allergic/Immunologic: Negative.    Neurological: Negative.    Hematological: Negative.    Psychiatric/Behavioral: Negative.         Objective:     Visit Vitals  /85 (BP Location: Left arm, Patient Position: Sitting, BP Method: Large (Automatic))   Pulse (!) 52   Temp 97.9 °F (36.6 °C) (Oral)   Resp 18   Ht 5' 4.02" (1.626 m)   Wt 98 kg (216 lb)   BMI 37.06 kg/m²         Physical Exam  Vitals reviewed.   Constitutional:       Appearance: Normal appearance. She is obese.   HENT:      Head: Normocephalic and atraumatic.      Mouth/Throat:      Mouth: Mucous membranes are moist.      Pharynx: Oropharynx is clear.   Eyes:      Extraocular Movements: Extraocular movements intact.      Conjunctiva/sclera: Conjunctivae normal.      Pupils: Pupils are equal, round, and reactive to light.   Cardiovascular:      Rate and Rhythm: Normal rate and regular rhythm.      Heart sounds: Normal heart sounds.   Pulmonary:      Effort: Pulmonary effort " is normal.      Breath sounds: Normal breath sounds.   Abdominal:      General: Bowel sounds are normal.   Musculoskeletal:         General: Normal range of motion.      Cervical back: Normal range of motion.   Skin:     General: Skin is warm and dry.   Neurological:      Mental Status: She is alert and oriented to person, place, and time.   Psychiatric:         Mood and Affect: Mood normal.         Behavior: Behavior normal.       Labs Reviewed:     Hematology:  Lab Results   Component Value Date    WBC 7.04 10/12/2023    HGB 13.3 10/12/2023    HCT 41.5 10/12/2023     10/12/2023     Chemistry:  Lab Results   Component Value Date     10/12/2023    K 4.1 10/12/2023    CHLORIDE 106 10/12/2023    BUN 12.7 10/12/2023    CREATININE 0.88 10/12/2023    EGFRNORACEVR >60 10/12/2023    GLUCOSE 109 10/12/2023    CALCIUM 9.8 10/12/2023    ALKPHOS 100 10/12/2023    LABPROT 7.6 10/12/2023    ALBUMIN 3.8 10/12/2023    BILIDIR 0.2 05/02/2022    IBILI 0.20 05/02/2022    AST 15 10/12/2023    ALT 11 10/12/2023    PBVLOFIR19QJ 30.7 10/12/2023     Lab Results   Component Value Date    HGBA1C 5.7 10/18/2021     Lipid Panel:  Lab Results   Component Value Date    CHOL 177 10/12/2023    HDL 46 10/12/2023    .00 10/12/2023    TRIG 135 10/12/2023    TOTALCHOLEST 4 10/12/2023     Thyroid:  Lab Results   Component Value Date    TSH 1.6622 08/22/2022     Urine:  Lab Results   Component Value Date    COLORUA Colorless 08/22/2022    APPEARANCEUA Clear 08/22/2022    SGUA 1.009 08/22/2022    PHUA 7.5 08/22/2022    PROTEINUA Negative 08/22/2022    GLUCOSEUA Normal 08/22/2022    KETONESUA Negative 08/22/2022    BLOODUA Negative 08/22/2022    NITRITESUA Negative 08/22/2022    LEUKOCYTESUR Negative 08/22/2022    RBCUA 0-5 08/22/2022    WBCUA 0-5 08/22/2022    BACTERIA Moderate (A) 08/22/2022    SQEPUA Trace (A) 08/22/2022    HYALINECASTS None Seen 08/22/2022    .3 (H) 10/12/2023        Assessment:       ICD-10-CM  ICD-9-CM   1. Benign hypertension  I10 401.1   2. Mixed hyperlipidemia  E78.2 272.2   3. Class 2 severe obesity due to excess calories with serious comorbidity and body mass index (BMI) of 37.0 to 37.9 in adult  E66.01 278.01    Z68.37 V85.37   4. Encounter for breast cancer screening using non-mammogram modality  Z12.39 V76.10   5. Encounter for colorectal cancer screening  Z12.11 V76.51    Z12.12 V76.41       Plan:     1. Benign hypertension  Vitals:    10/12/23 1017   BP: 137/85   Pulse: (!) 52   Resp: 18   Temp: 97.9 °F (36.6 °C)      Results for orders placed or performed in visit on 10/12/23   Microalbumin/Creatinine Ratio, Urine   Result Value Ref Range    Urine Microalbumin 667.1 (H) <=30.0 ug/ml    Urine Creatinine 179.3 (H) 45.0 - 106.0 mg/dL    Microalbumin Creatinine Ratio 372.1 (H) 0.0 - 30.0 mg/gm Cr     Results for orders placed or performed during the hospital encounter of 11/15/22   EKG 12-lead (Weakness) Age > 50    Collection Time: 11/15/22  8:57 AM    Narrative    Test Reason : R53.1,    Vent. Rate : 063 BPM     Atrial Rate : 063 BPM     P-R Int : 138 ms          QRS Dur : 090 ms      QT Int : 390 ms       P-R-T Axes : 023 023 -10 degrees     QTc Int : 399 ms    Normal sinus rhythm  Minimal voltage criteria for LVH, may be normal variant  Borderline Abnormal ECG  When compared with ECG of 02-MAY-2022 08:31,  T wave inversion now evident in Inferior leads  Confirmed by Nishant Mccarthy MD ([id number]) on 11/15/2022 9:21:04 AM    Referred By: [email or web address]   SELF           Confirmed By:Nishant Mccarthy MD   Continue Losartan 100 mg daily, Metoprolol Succ 25 mg daily  DASH diet  Encouraged home blood pressure monitoring    2. Mixed hyperlipidemia  Continue Simvastatin 20 mg daily  Weight loss encouraged  Low fat/high fiber diet  Increase physical activity  Cholesterol Total   Date Value Ref Range Status   10/12/2023 177 <=200 mg/dL Final     HDL Cholesterol   Date Value Ref Range Status   10/12/2023 46 35 - 60  mg/dL Final     Triglyceride   Date Value Ref Range Status   10/12/2023 135 37 - 140 mg/dL Final     LDL Cholesterol   Date Value Ref Range Status   10/12/2023 104.00 50.00 - 140.00 mg/dL Final     3. Class 2 severe obesity due to excess calories with serious comorbidity and body mass index (BMI) of 37.0 to 37.9 in adult  Body mass index is 37.06 kg/m².  TSH   Date Value Ref Range Status   08/22/2022 1.6622 0.3500 - 4.9400 uIU/mL Final     Vit D 25 OH   Date Value Ref Range Status   10/12/2023 30.7 30.0 - 80.0 ng/mL Final     Hemoglobin A1c   Date Value Ref Range Status   10/18/2021 5.7 <<=7.0 % Final   Sleep Study: none noted  Weight Loss Encouraged  Increase Physical Activity    4. Encounter for breast cancer screening using non-mammogram modality  - MRI Breast w/wo Contrast, w/CAD, Bilateral; Future    5. Encounter for colorectal cancer screening  - Ambulatory referral/consult to Gastroenterology; Future      Follow up in about 6 months (around 4/12/2024) for Labs. In addition to their scheduled follow up, the patient has also been instructed to follow up on as needed basis.     MARY Avalos

## 2023-11-28 ENCOUNTER — DOCUMENTATION ONLY (OUTPATIENT)
Dept: RADIOLOGY | Facility: HOSPITAL | Age: 68
End: 2023-11-28
Payer: MEDICARE

## 2024-01-25 ENCOUNTER — TELEPHONE (OUTPATIENT)
Dept: INTERNAL MEDICINE | Facility: CLINIC | Age: 69
End: 2024-01-25
Payer: MEDICARE

## 2024-01-25 NOTE — TELEPHONE ENCOUNTER
Pt would like to know is it possible for you to give her a letter stating that her toilet in her apartment is too low. She stated if you are able to provide this letter for her they will provide a new toilet.    LOV:10/12/23  NOV:4/15/24    [phone]  Attn:Michael

## 2024-02-05 ENCOUNTER — HOSPITAL ENCOUNTER (EMERGENCY)
Facility: HOSPITAL | Age: 69
Discharge: HOME | End: 2024-02-05
Attending: STUDENT IN AN ORGANIZED HEALTH CARE EDUCATION/TRAINING PROGRAM
Payer: MEDICARE

## 2024-02-05 VITALS
HEIGHT: 63 IN | HEART RATE: 69 BPM | SYSTOLIC BLOOD PRESSURE: 126 MMHG | BODY MASS INDEX: 36.83 KG/M2 | OXYGEN SATURATION: 96 % | DIASTOLIC BLOOD PRESSURE: 81 MMHG | WEIGHT: 207.88 LBS | TEMPERATURE: 98 F | RESPIRATION RATE: 16 BRPM

## 2024-02-05 DIAGNOSIS — J06.9 UPPER RESPIRATORY TRACT INFECTION, UNSPECIFIED TYPE: Primary | ICD-10-CM

## 2024-02-05 LAB
FLUAV AG UPPER RESP QL IA.RAPID: NOT DETECTED
FLUBV AG UPPER RESP QL IA.RAPID: NOT DETECTED
SARS-COV-2 RNA RESP QL NAA+PROBE: NOT DETECTED
STREP A PCR (OHS): NOT DETECTED

## 2024-02-05 PROCEDURE — 87651 STREP A DNA AMP PROBE: CPT | Performed by: PHYSICIAN ASSISTANT

## 2024-02-05 PROCEDURE — 0240U COVID/FLU A&B PCR: CPT | Performed by: PHYSICIAN ASSISTANT

## 2024-02-05 PROCEDURE — 99283 EMERGENCY DEPT VISIT LOW MDM: CPT | Mod: 25

## 2024-02-05 RX ORDER — FLUTICASONE PROPIONATE 50 MCG
1 SPRAY, SUSPENSION (ML) NASAL 2 TIMES DAILY PRN
Qty: 15 G | Refills: 0 | Status: SHIPPED | OUTPATIENT
Start: 2024-02-05

## 2024-02-05 RX ORDER — BENZONATATE 100 MG/1
100 CAPSULE ORAL 3 TIMES DAILY PRN
Qty: 21 CAPSULE | Refills: 0 | Status: SHIPPED | OUTPATIENT
Start: 2024-02-05 | End: 2024-02-12

## 2024-02-05 NOTE — ED PROVIDER NOTES
Encounter Date: 2/5/2024       History     Chief Complaint   Patient presents with    Cough     Pt c/o cough x 1 week and runny nose x 2 days.      Rabia Valencia is a 68 y.o. female who presents to the ED with complaints of cough and runny nose that started 1 week(s) ago.  She denies fever. She reports off and on body aches. She denies known sick contacts, shortness of breath, chest pain, dizziness, nausea, vomiting, diarrhea, sore throat, and ear pain.       The history is provided by the patient. No  was used.     Review of patient's allergies indicates:   Allergen Reactions    Iodine     Morphine      HIVES    Opioids - morphine analogues      Pt informed post surgery by MD that she is allergic to morphine    Iodine and iodide containing products Rash     Past Medical History:   Diagnosis Date    Hyperlipidemia     Hypertension      Past Surgical History:   Procedure Laterality Date    BREAST SURGERY       Family History   Problem Relation Age of Onset    Hypertension Mother     Cancer Father     Breast cancer Sister      Social History     Tobacco Use    Smoking status: Never    Smokeless tobacco: Never   Substance Use Topics    Alcohol use: Never    Drug use: Never     Review of Systems   Constitutional:  Negative for chills, fatigue and fever.   HENT:  Positive for rhinorrhea. Negative for congestion, ear pain, sinus pain and sore throat.    Eyes:  Negative for pain.   Respiratory:  Positive for cough. Negative for chest tightness and shortness of breath.    Cardiovascular:  Negative for chest pain.   Gastrointestinal:  Negative for abdominal pain, constipation, diarrhea, nausea and vomiting.   Genitourinary:  Negative for dysuria.   Musculoskeletal:  Negative for back pain and joint swelling.   Skin:  Negative for color change and rash.   Neurological:  Negative for dizziness, weakness and headaches.   Psychiatric/Behavioral:  Negative for behavioral problems and confusion.        Physical  Exam     Initial Vitals [02/05/24 0848]   BP Pulse Resp Temp SpO2   126/81 69 16 98.1 °F (36.7 °C) 96 %      MAP       --         Physical Exam    Constitutional: She appears well-developed and well-nourished.   HENT:   Head: Normocephalic and atraumatic.   Nose: Nose normal.   Clear post nasal drainage without erythema or exudates   Eyes: EOM are normal. Pupils are equal, round, and reactive to light.   Neck: Neck supple. No thyromegaly present. No JVD present.   Normal range of motion.  Cardiovascular:  Normal rate, regular rhythm, normal heart sounds and intact distal pulses.           No murmur heard.  Pulmonary/Chest: Breath sounds normal. No respiratory distress. She has no wheezes. She has no rhonchi. She has no rales. She exhibits no tenderness.   + cough   Abdominal: Abdomen is soft. Bowel sounds are normal. She exhibits no distension. There is no abdominal tenderness. There is no rebound and no guarding.   Musculoskeletal:         General: No tenderness or edema. Normal range of motion.      Cervical back: Normal range of motion and neck supple.     Lymphadenopathy:     She has no cervical adenopathy.   Neurological: She is alert and oriented to person, place, and time.   Skin: Skin is warm and dry. Capillary refill takes less than 2 seconds.   Psychiatric: She has a normal mood and affect. Thought content normal.         ED Course   Procedures  Labs Reviewed   COVID/FLU A&B PCR - Normal    Narrative:     The Xpert Xpress SARS-CoV-2/FLU/RSV plus is a rapid, multiplexed real-time PCR test intended for the simultaneous qualitative detection and differentiation of SARS-CoV-2, Influenza A, Influenza B, and respiratory syncytial virus (RSV) viral RNA in either nasopharyngeal swab or nasal swab specimens.         STREP GROUP A BY PCR - Normal    Narrative:     The Xpert Xpress Strep A test is a rapid, qualitative in vitro diagnostic test for the detection of Streptococcus pyogenes (Group A ß-hemolytic  Streptococcus, Strep A) in throat swab specimens from patients with signs and symptoms of pharyngitis.            Imaging Results              X-Ray Chest 1 View (Final result)  Result time 02/05/24 09:42:58      Final result by Sloan Street MD (02/05/24 09:42:58)                   Impression:      No acute cardiopulmonary process.      Electronically signed by: Sloan Street  Date:    02/05/2024  Time:    09:42               Narrative:    EXAMINATION:  XR CHEST 1 VIEW    CLINICAL HISTORY:  cough;    TECHNIQUE:  Single view of the chest    COMPARISON:  05/02/2022    FINDINGS:  No focal opacification, pleural effusion, or pneumothorax.    The cardiomediastinal silhouette is within normal limits.    No acute osseous abnormality.                                       Medications - No data to display  Medical Decision Making  DDX: covid, flu, strep, uri, pneumonia, allergic rhinitis, among others    Rabia Valencia is a 68 y.o. female who presents to the ED with complaints of cough and runny nose that started 1 week(s) ago.  She denies fever. She reports off and on body aches. She denies known sick contacts, shortness of breath, chest pain, dizziness, nausea, vomiting, diarrhea, sore throat, and ear pain. Swabs and chest xray obtained. Xray without cardiopulmonary process identified. Covid/flu/strep negative. Will DC home with cough medication. Instructed her to restart her Claritin and I will add Flonase. She verbalized understanding. Strict return precautions given. Stable for discharge.     Amount and/or Complexity of Data Reviewed  Labs: ordered.  Radiology: ordered.                                      Clinical Impression:  Final diagnoses:  [J06.9] Upper respiratory tract infection, unspecified type (Primary)          ED Disposition Condition    Discharge Stable          ED Prescriptions       Medication Sig Dispense Start Date End Date Auth. Provider    fluticasone propionate (FLONASE) 50 mcg/actuation nasal  spray 1 spray (50 mcg total) by Each Nostril route 2 (two) times daily as needed for Rhinitis. 15 g 2/5/2024 -- Jael Steel PA-C    benzonatate (TESSALON) 100 MG capsule Take 1 capsule (100 mg total) by mouth 3 (three) times daily as needed for Cough. 21 capsule 2/5/2024 2/12/2024 Jael Steel PA-C          Follow-up Information       Follow up With Specialties Details Why Contact Info    [facility]   [address] [facility] [address]  [phone]      [facility] Emergency Medicine In 3 days As needed, If symptoms worsen [address] [facility] [address] [phone]             Jael Steel PA-C  02/05/24 1045

## 2024-02-09 NOTE — TELEPHONE ENCOUNTER
Pt requested Letter be sent to [employer] at  Fax # [phone]. Faxed letter sent to fax number provided. Will scan in chart once successful confirmation is received .

## 2024-02-28 ENCOUNTER — HOSPITAL ENCOUNTER (OUTPATIENT)
Dept: RADIOLOGY | Facility: HOSPITAL | Age: 69
Discharge: HOME | End: 2024-02-28
Attending: NURSE PRACTITIONER
Payer: MEDICARE

## 2024-02-28 DIAGNOSIS — Z12.39 BREAST CANCER SCREENING: ICD-10-CM

## 2024-02-28 DIAGNOSIS — Z12.31 ENCOUNTER FOR SCREENING MAMMOGRAM FOR MALIGNANT NEOPLASM OF BREAST: ICD-10-CM

## 2024-02-28 PROCEDURE — 77063 BREAST TOMOSYNTHESIS BI: CPT | Mod: 26,,, | Performed by: RADIOLOGY

## 2024-02-28 PROCEDURE — 77067 SCR MAMMO BI INCL CAD: CPT | Mod: 26,,, | Performed by: RADIOLOGY

## 2024-02-28 PROCEDURE — 77067 SCR MAMMO BI INCL CAD: CPT | Mod: TC

## 2024-04-23 ENCOUNTER — TELEPHONE (OUTPATIENT)
Dept: INTERNAL MEDICINE | Facility: CLINIC | Age: 69
End: 2024-04-23
Payer: MEDICARE

## 2024-04-23 NOTE — TELEPHONE ENCOUNTER
Contacted to reschedule appointment and discuss medication refill. . Rescheduled appointment for  May 15,2024 at 10:00 AM. Called [facility] pharmacy to verify refills.  Informed that she has refills for her losartan 100 mg , metoprolol succinate 25 mg and simvastatin 20 mg. She voiced understanding.

## 2024-05-01 ENCOUNTER — HOSPITAL ENCOUNTER (EMERGENCY)
Facility: HOSPITAL | Age: 69
Discharge: HOME | End: 2024-05-01
Attending: EMERGENCY MEDICINE
Payer: MEDICARE

## 2024-05-01 VITALS
SYSTOLIC BLOOD PRESSURE: 160 MMHG | HEIGHT: 63 IN | OXYGEN SATURATION: 95 % | HEART RATE: 92 BPM | TEMPERATURE: 98 F | RESPIRATION RATE: 20 BRPM | WEIGHT: 214.94 LBS | BODY MASS INDEX: 38.09 KG/M2 | DIASTOLIC BLOOD PRESSURE: 94 MMHG

## 2024-05-01 DIAGNOSIS — M17.12 OSTEOARTHRITIS OF LEFT KNEE, UNSPECIFIED OSTEOARTHRITIS TYPE: ICD-10-CM

## 2024-05-01 DIAGNOSIS — I10 CHRONIC HYPERTENSION: ICD-10-CM

## 2024-05-01 DIAGNOSIS — M25.562 LEFT KNEE PAIN, UNSPECIFIED CHRONICITY: Primary | ICD-10-CM

## 2024-05-01 PROCEDURE — 99284 EMERGENCY DEPT VISIT MOD MDM: CPT | Mod: 25

## 2024-05-01 PROCEDURE — 63600175 PHARM REV CODE 636 W HCPCS: Performed by: PHYSICIAN ASSISTANT

## 2024-05-01 PROCEDURE — 96372 THER/PROPH/DIAG INJ SC/IM: CPT | Performed by: PHYSICIAN ASSISTANT

## 2024-05-01 RX ORDER — KETOROLAC TROMETHAMINE 30 MG/ML
15 INJECTION, SOLUTION INTRAMUSCULAR; INTRAVENOUS
Status: COMPLETED | OUTPATIENT
Start: 2024-05-01 | End: 2024-05-01

## 2024-05-01 RX ORDER — MELOXICAM 7.5 MG/1
7.5 TABLET ORAL DAILY PRN
Qty: 10 TABLET | Refills: 0 | Status: SHIPPED | OUTPATIENT
Start: 2024-05-01 | End: 2024-05-15 | Stop reason: SDUPTHER

## 2024-05-01 RX ADMIN — KETOROLAC TROMETHAMINE 15 MG: 30 INJECTION, SOLUTION INTRAMUSCULAR at 09:05

## 2024-05-01 NOTE — ED PROVIDER NOTES
Encounter Date: 5/1/2024       History     Chief Complaint   Patient presents with    Knee Pain     C/o left knee pain. States has bilateral knee pain but left knee worse. Denies any injury     Patient with pmhx of HTN, HLD, and OA of left knee presents today c/o worsening left knee pain for 3 weeks. Pain is worse with movement. She has tried taking tylenol with no improvement. She admits both her knees have bothered her for a while, but the left hurts more. She denies any recent injury/trauma, calf pain or swelling, fever, chills, cp, sob. Did not take antihypertensive this morning.     The history is provided by the patient. No  was used.     Review of patient's allergies indicates:   Allergen Reactions    Iodine     Morphine      HIVES    Opioids - morphine analogues      Pt informed post surgery by MD that she is allergic to morphine    Iodine and iodide containing products Rash     Past Medical History:   Diagnosis Date    Hyperlipidemia     Hypertension      Past Surgical History:   Procedure Laterality Date    BREAST SURGERY       Family History   Problem Relation Name Age of Onset    Hypertension Mother      Cancer Father      Breast cancer Sister       Social History     Tobacco Use    Smoking status: Never    Smokeless tobacco: Never   Substance Use Topics    Alcohol use: Never    Drug use: Never     Review of Systems   Constitutional: Negative.  Negative for chills and fever.   Respiratory: Negative.  Negative for shortness of breath.    Cardiovascular: Negative.  Negative for chest pain.   Gastrointestinal: Negative.  Negative for abdominal pain, diarrhea, nausea and vomiting.   Genitourinary:  Negative for dysuria, flank pain and hematuria.   Musculoskeletal:         Knee pain   Skin: Negative.    Neurological:  Negative for dizziness, syncope, light-headedness, numbness and headaches.   All other systems reviewed and are negative.      Physical Exam     Initial Vitals [05/01/24  0900]   BP Pulse Resp Temp SpO2   (!) 160/94 92 20 98.4 °F (36.9 °C) 95 %      MAP       --         Physical Exam    Nursing note and vitals reviewed.  Constitutional: She appears well-developed. She is not diaphoretic. No distress.   Obese   HENT:   Head: Normocephalic and atraumatic.   Mouth/Throat: Oropharynx is clear and moist. No oropharyngeal exudate.   Eyes: Conjunctivae and EOM are normal.   Neck: Neck supple.   Normal range of motion.  Cardiovascular:  Normal rate, regular rhythm, normal heart sounds and intact distal pulses.           No obvious discrepancy in calf sizes. Negative dequan sign bilaterally. Intact distal pulses.    Pulmonary/Chest: Breath sounds normal. No respiratory distress.   Abdominal: Abdomen is soft. She exhibits no distension. There is no abdominal tenderness. There is no rebound.   Musculoskeletal:         General: No edema.      Cervical back: Normal range of motion and neck supple.      Comments: Bilateral knees are non-ttp. No significant swelling. No overlying erythema, warmth, or ecchymosis. Full AROM of bilateral knees, but increased pain with movement. Palpable crepitus on the left. NVI.      Neurological: She is alert and oriented to person, place, and time. GCS score is 15. GCS eye subscore is 4. GCS verbal subscore is 5. GCS motor subscore is 6.   Skin: Skin is warm and dry. Capillary refill takes less than 2 seconds. No rash noted.   Psychiatric: She has a normal mood and affect.         ED Course   Procedures  Labs Reviewed - No data to display       Imaging Results    None          Medications   ketorolac injection 15 mg (has no administration in time range)     Medical Decision Making  Ddx: osteoarthritis, inflammatory arthritis, knee sprain, internal derangement of left knee, amongst others    Patient is non-toxic appearing. Vitals stable. Prior imaging reviewed from 2019 which showed tricompartmental OA of left knee. She has not had any recent injury or trauma.  Suspect pain is related to known OA which may have worsened since prior imaging. Recommend mobic as needed for pain and following up with pcp for repeat imaging and management. She is stable for discharge. ED precautions given.     Amount and/or Complexity of Data Reviewed  External Data Reviewed: radiology and notes.     Details: Left knee x-ray from 2019 reviewed which shows tricompartmental OA.     Risk  Risk Details: Given strict ED return precautions. I have spoken with the patient and/or caregivers. I have explained the patient's condition, diagnoses and treatment plan based on the information available to me at this time. I have answered the patient's and/or caregiver's questions and addressed any concerns. The patient and/or caregivers have as good an understanding of the patient's diagnosis, condition and treatment plan as can be expected at this point. The vital signs have been stable. The patient's condition is stable and appropriate for discharge from the emergency department.      The patient will pursue further outpatient evaluation with the primary care physician or other designated or consulting physician as outlined in the discharge instructions. The patient and/or caregivers are agreeable to this plan of care and follow-up instructions have been explained in detail. The patient and/or caregivers have received these instructions in written format and have expressed an understanding of the discharge instructions. The patient and/or caregivers are aware that any significant change in condition or worsening of symptoms should prompt an immediate return to this or the closest emergency department or a call to 911                                      Clinical Impression:  Final diagnoses:  [M25.562] Left knee pain, unspecified chronicity (Primary)  [M17.12] Osteoarthritis of left knee, unspecified osteoarthritis type  [I10] Chronic hypertension          ED Disposition Condition    Discharge Stable           ED Prescriptions       Medication Sig Dispense Start Date End Date Auth. Provider    meloxicam (MOBIC) 7.5 MG tablet Take 1 tablet (7.5 mg total) by mouth daily as needed for Pain. 10 tablet 5/1/2024 5/11/2024 Merna Thornton PA          Follow-up Information       Follow up With Specialties Details Why Contact Info    [facility] - [facility] Dept Emergency Medicine  If symptoms worsen return to ED immediately [address] [facility] [id number] [phone]    Adia Elam, FNP Family Medicine In 2 days  [address] [facility] [address]  [phone]               Merna Thornton PA  05/01/24 0916

## 2024-05-15 ENCOUNTER — OFFICE VISIT (OUTPATIENT)
Dept: INTERNAL MEDICINE | Facility: CLINIC | Age: 69
End: 2024-05-15
Payer: MEDICARE

## 2024-05-15 ENCOUNTER — HOSPITAL ENCOUNTER (OUTPATIENT)
Dept: RADIOLOGY | Facility: HOSPITAL | Age: 69
Discharge: HOME | End: 2024-05-15
Attending: NURSE PRACTITIONER
Payer: MEDICARE

## 2024-05-15 VITALS
DIASTOLIC BLOOD PRESSURE: 86 MMHG | TEMPERATURE: 98 F | BODY MASS INDEX: 38.47 KG/M2 | HEIGHT: 63 IN | WEIGHT: 217.13 LBS | SYSTOLIC BLOOD PRESSURE: 148 MMHG | HEART RATE: 65 BPM | OXYGEN SATURATION: 97 % | RESPIRATION RATE: 18 BRPM

## 2024-05-15 DIAGNOSIS — G89.29 CHRONIC PAIN OF LEFT KNEE: Primary | ICD-10-CM

## 2024-05-15 DIAGNOSIS — E66.01 CLASS 3 SEVERE OBESITY DUE TO EXCESS CALORIES WITH SERIOUS COMORBIDITY AND BODY MASS INDEX (BMI) OF 40.0 TO 44.9 IN ADULT: Chronic | ICD-10-CM

## 2024-05-15 DIAGNOSIS — E55.9 VITAMIN D DEFICIENCY: Chronic | ICD-10-CM

## 2024-05-15 DIAGNOSIS — I10 BENIGN HYPERTENSION: Chronic | ICD-10-CM

## 2024-05-15 DIAGNOSIS — Z12.39 ENCOUNTER FOR BREAST CANCER SCREENING USING NON-MAMMOGRAM MODALITY: ICD-10-CM

## 2024-05-15 DIAGNOSIS — M25.562 CHRONIC PAIN OF LEFT KNEE: Primary | ICD-10-CM

## 2024-05-15 DIAGNOSIS — G89.29 CHRONIC PAIN OF LEFT KNEE: ICD-10-CM

## 2024-05-15 DIAGNOSIS — Z78.0 POST-MENOPAUSE: ICD-10-CM

## 2024-05-15 DIAGNOSIS — M25.562 CHRONIC PAIN OF LEFT KNEE: ICD-10-CM

## 2024-05-15 DIAGNOSIS — E78.2 MIXED HYPERLIPIDEMIA: Chronic | ICD-10-CM

## 2024-05-15 PROCEDURE — 99214 OFFICE O/P EST MOD 30 MIN: CPT | Mod: S$PBB,,, | Performed by: NURSE PRACTITIONER

## 2024-05-15 PROCEDURE — 3008F BODY MASS INDEX DOCD: CPT | Mod: CPTII,,, | Performed by: NURSE PRACTITIONER

## 2024-05-15 PROCEDURE — 73562 X-RAY EXAM OF KNEE 3: CPT | Mod: TC,LT

## 2024-05-15 PROCEDURE — 1160F RVW MEDS BY RX/DR IN RCRD: CPT | Mod: CPTII,,, | Performed by: NURSE PRACTITIONER

## 2024-05-15 PROCEDURE — 4010F ACE/ARB THERAPY RXD/TAKEN: CPT | Mod: CPTII,,, | Performed by: NURSE PRACTITIONER

## 2024-05-15 PROCEDURE — 1159F MED LIST DOCD IN RCRD: CPT | Mod: CPTII,,, | Performed by: NURSE PRACTITIONER

## 2024-05-15 PROCEDURE — 1101F PT FALLS ASSESS-DOCD LE1/YR: CPT | Mod: CPTII,,, | Performed by: NURSE PRACTITIONER

## 2024-05-15 PROCEDURE — 3288F FALL RISK ASSESSMENT DOCD: CPT | Mod: CPTII,,, | Performed by: NURSE PRACTITIONER

## 2024-05-15 PROCEDURE — 3079F DIAST BP 80-89 MM HG: CPT | Mod: CPTII,,, | Performed by: NURSE PRACTITIONER

## 2024-05-15 PROCEDURE — 99215 OFFICE O/P EST HI 40 MIN: CPT | Mod: PBBFAC,25 | Performed by: NURSE PRACTITIONER

## 2024-05-15 PROCEDURE — 1125F AMNT PAIN NOTED PAIN PRSNT: CPT | Mod: CPTII,,, | Performed by: NURSE PRACTITIONER

## 2024-05-15 PROCEDURE — 3077F SYST BP >= 140 MM HG: CPT | Mod: CPTII,,, | Performed by: NURSE PRACTITIONER

## 2024-05-15 RX ORDER — SIMVASTATIN 20 MG/1
20 TABLET, FILM COATED ORAL NIGHTLY
Qty: 90 TABLET | Refills: 2 | Status: SHIPPED | OUTPATIENT
Start: 2024-05-15

## 2024-05-15 RX ORDER — MELOXICAM 15 MG/1
15 TABLET ORAL DAILY PRN
Qty: 10 TABLET | Refills: 0 | Status: SHIPPED | OUTPATIENT
Start: 2024-05-15 | End: 2024-05-25

## 2024-05-15 RX ORDER — ERGOCALCIFEROL 1.25 MG/1
50000 CAPSULE ORAL
Qty: 12 CAPSULE | Refills: 2 | Status: SHIPPED | OUTPATIENT
Start: 2024-05-15

## 2024-05-15 RX ORDER — LOSARTAN POTASSIUM 100 MG/1
100 TABLET ORAL DAILY
Qty: 90 TABLET | Refills: 2 | Status: SHIPPED | OUTPATIENT
Start: 2024-05-15

## 2024-05-15 RX ORDER — METOPROLOL SUCCINATE 25 MG/1
25 TABLET, EXTENDED RELEASE ORAL DAILY
Qty: 90 TABLET | Refills: 2 | Status: SHIPPED | OUTPATIENT
Start: 2024-05-15

## 2024-05-15 NOTE — PROGRESS NOTES
Patient ID: [id number]     Chief Complaint: Follow-up (Pt c/o B/L knee pain ongoing for 1x month.)    HPI:     Rabia Valencia is a 68 y.o. female with diagnosis of HTN, HLD, Vitamin D Deficiency, Obesity, Hysterectomy. Patient seen in clinic today for follow up. Patient last seen in clinic on 10/12/2023.  Patient missed follow up appointment.   Patient currently prescribed Simvastatin 20 mg daily for HLD. Patient states taking medication as prescribed, tolerating well.   Patient currently prescribed Losartan 100 mg daily, Metoprolol Succ 25 mg daily for HTN. B/P today 137/85.   Patient had a positive Cologuard, Colonoscopy was scheduled for 02/16/2023 but patient canceled. Patient states her son has to bring her and pick her up but he is a  and is not home enough. Patient states she doesn't have anyone else. Message sent to GI clinic.   Today, patient states left knee pain. Left knee X-ray completed 06/10/2019; revealed tricompartmental osteoarthritic changes are present and worst in the  medial compartment. Patient denies Ortho, PT. Patient previously prescribed Meloxicam 7.5 mg daily, states no improvement.   Patient denies any acute complaints.     Patient followed by GYN Clinic. Last appointment on 06/09/2023. Patient has follow up appointment scheduled for 07/03/2024     Patient was seen by Neurology Clinic for headaches. Last appointment on 02/28/2022. Patient instructed to continue blood pressure management with PCP. Headache education provided - including good sleep hygiene, stress management, medication overuse education provided - using more 3 OTC per week may worsen headaches, High intensity interval training has shown to reduce headache frequency. Low carb, high protein has shown to reduce headache frequency. Patient is instructed to keep headache diary. Patient to follow up as needed.    Review of patient's allergies indicates:   Allergen Reactions    Iodine     Morphine      HIVES    Opioids -  morphine analogues      Pt informed post surgery by MD that she is allergic to morphine    Iodine and iodide containing products Rash     Breast Cancer Screening: MMG benign on 02/28/2024;   Cervical Cancer Screening: Hysterectomy  Colorectal Cancer Screening: Cologuard positive on 09/19/2022; GI referral ordered for Colonoscopy; patient was scheduled for 02/16/2023 but canceled appt  Diabetic Eye Exam: N/A  Diabetic Foot Exam: N/A  Lung Cancer Screening: N/A  Prostate Cancer Screening: N/A  AAA Screening: N/A  Osteoporosis Screening: normal on 04/27/2021  Medicare Wellness: ordered  Immunizations:   Immunization History   Administered Date(s) Administered    COVID-19, MRNA, LN-S, PF (Pfizer) (Purple Cap) 06/15/2021, 07/06/2021     Past Surgical History:   Procedure Laterality Date    BREAST SURGERY       family history includes Breast cancer in her sister; Cancer in her father; Hypertension in her mother.    Social History     Socioeconomic History    Marital status: Single    Number of children: 2   Tobacco Use    Smoking status: Never    Smokeless tobacco: Never   Substance and Sexual Activity    Alcohol use: Never    Drug use: Never    Sexual activity: Not Currently     Partners: Male     Social Determinants of Health     Financial Resource Strain: Low Risk  (4/6/2023)    Overall Financial Resource Strain (CARDIA)     Difficulty of Paying Living Expenses: Not hard at all   Food Insecurity: No Food Insecurity (4/6/2023)    Hunger Vital Sign     Worried About Running Out of Food in the Last Year: Never true     Ran Out of Food in the Last Year: Never true   Transportation Needs: No Transportation Needs (4/6/2023)    PRAPARE - Transportation     Lack of Transportation (Medical): No     Lack of Transportation (Non-Medical): No   Physical Activity: Inactive (4/6/2023)    Exercise Vital Sign     Days of Exercise per Week: 0 days     Minutes of Exercise per Session: 0 min   Stress: No Stress Concern Present (4/6/2023)  "   [employer] [place] [employer] - Occupational Stress Questionnaire     Feeling of Stress : Only a little   Housing Stability: Low Risk  (4/6/2023)    Housing Stability Vital Sign     Unable to Pay for Housing in the Last Year: No     Number of Places Lived in the Last Year: 1     Unstable Housing in the Last Year: No     Current Outpatient Medications   Medication Instructions    ergocalciferol (ERGOCALCIFEROL) 50,000 Units, Oral, Every 7 days    fluticasone propionate (FLONASE) 50 mcg, Each Nostril, 2 times daily PRN    losartan (COZAAR) 100 mg, Oral, Daily    meloxicam (MOBIC) 15 mg, Oral, Daily PRN    metoprolol succinate (TOPROL-XL) 25 mg, Oral, Daily    simvastatin (ZOCOR) 20 mg, Oral, Nightly       Subjective:     Review of Systems   Constitutional: Negative.    HENT: Negative.     Eyes: Negative.    Respiratory: Negative.     Cardiovascular: Negative.    Gastrointestinal: Negative.    Endocrine: Negative.    Genitourinary: Negative.    Musculoskeletal:  Positive for arthralgias.   Skin: Negative.    Allergic/Immunologic: Negative.    Neurological: Negative.    Hematological: Negative.    Psychiatric/Behavioral: Negative.         Objective:     Visit Vitals  BP (!) 148/86 (BP Location: Right arm, Patient Position: Sitting, BP Method: Large (Manual))   Pulse 65   Temp 97.5 °F (36.4 °C) (Oral)   Resp 18   Ht 5' 3" (1.6 m)   Wt 98.5 kg (217 lb 2.5 oz)   SpO2 97%   BMI 38.47 kg/m²       Physical Exam  Vitals reviewed.   Constitutional:       Appearance: Normal appearance. She is obese.   HENT:      Head: Normocephalic and atraumatic.      Mouth/Throat:      Mouth: Mucous membranes are moist.      Pharynx: Oropharynx is clear.   Eyes:      Extraocular Movements: Extraocular movements intact.      Conjunctiva/sclera: Conjunctivae normal.      Pupils: Pupils are equal, round, and reactive to light.   Cardiovascular:      Rate and Rhythm: Normal rate and regular rhythm.      Heart sounds: Normal heart " sounds.   Pulmonary:      Effort: Pulmonary effort is normal.      Breath sounds: Normal breath sounds.   Abdominal:      General: Bowel sounds are normal.   Musculoskeletal:         General: Normal range of motion.      Cervical back: Normal range of motion.   Skin:     General: Skin is warm and dry.   Neurological:      Mental Status: She is alert and oriented to person, place, and time.   Psychiatric:         Mood and Affect: Mood normal.         Behavior: Behavior normal.       Labs Reviewed:     Hematology:  Lab Results   Component Value Date    WBC 7.04 10/12/2023    HGB 13.3 10/12/2023    HCT 41.5 10/12/2023     10/12/2023     Chemistry:  Lab Results   Component Value Date     10/12/2023    K 4.1 10/12/2023    CHLORIDE 106 10/12/2023    BUN 12.7 10/12/2023    CREATININE 0.88 10/12/2023    EGFRNORACEVR >60 10/12/2023    GLUCOSE 109 10/12/2023    CALCIUM 9.8 10/12/2023    ALKPHOS 100 10/12/2023    LABPROT 7.6 10/12/2023    ALBUMIN 3.8 10/12/2023    BILIDIR 0.2 05/02/2022    IBILI 0.20 05/02/2022    AST 15 10/12/2023    ALT 11 10/12/2023    NLNYDBYT92SS 30.7 10/12/2023     Lab Results   Component Value Date    HGBA1C 5.7 10/18/2021     Lipid Panel:  Lab Results   Component Value Date    CHOL 177 10/12/2023    HDL 46 10/12/2023    .00 10/12/2023    TRIG 135 10/12/2023    TOTALCHOLEST 4 10/12/2023     Thyroid:  Lab Results   Component Value Date    TSH 1.6622 08/22/2022     Urine:  Lab Results   Component Value Date    COLORUA Colorless 08/22/2022    APPEARANCEUA Clear 08/22/2022    SGUA 1.009 08/22/2022    PHUA 7.5 08/22/2022    PROTEINUA Negative 08/22/2022    GLUCOSEUA Normal 08/22/2022    KETONESUA Negative 08/22/2022    BLOODUA Negative 08/22/2022    NITRITESUA Negative 08/22/2022    LEUKOCYTESUR Negative 08/22/2022    RBCUA 0-5 08/22/2022    WBCUA 0-5 08/22/2022    BACTERIA Moderate (A) 08/22/2022    SQEPUA Trace (A) 08/22/2022    HYALINECASTS None Seen 08/22/2022    .3 (H)  10/12/2023        Assessment:       ICD-10-CM ICD-9-CM   1. Chronic pain of left knee  M25.562 719.46    G89.29 338.29   2. Benign hypertension  I10 401.1   3. Mixed hyperlipidemia  E78.2 272.2   4. Class 3 severe obesity due to excess calories with serious comorbidity and body mass index (BMI) of 40.0 to 44.9 in adult  E66.01 278.01    Z68.41 V85.41   5. Vitamin D deficiency  E55.9 268.9   6. Post-menopause  Z78.0 V49.81   7. Encounter for breast cancer screening using non-mammogram modality  Z12.39 V76.10       Plan:     1. Chronic pain of left knee  Increase Meloxicam to 15 mg daily  Left knee X-ray ordered  - X-Ray Knee 3 View Left; Future    2. Benign hypertension  Vitals:    05/15/24 1003   BP: (!) 148/86   Pulse:    Resp:    Temp:       Results for orders placed or performed in visit on 10/12/23   Microalbumin/Creatinine Ratio, Urine   Result Value Ref Range    Urine Microalbumin 667.1 (H) <=30.0 ug/ml    Urine Creatinine 179.3 (H) 45.0 - 106.0 mg/dL    Microalbumin Creatinine Ratio 372.1 (H) 0.0 - 30.0 mg/gm Cr     Results for orders placed or performed during the hospital encounter of 11/15/22   EKG 12-lead (Weakness) Age > 50    Collection Time: 11/15/22  8:57 AM    Narrative    Test Reason : R53.1,    Vent. Rate : 063 BPM     Atrial Rate : 063 BPM     P-R Int : 138 ms          QRS Dur : 090 ms      QT Int : 390 ms       P-R-T Axes : 023 023 -10 degrees     QTc Int : 399 ms    Normal sinus rhythm  Minimal voltage criteria for LVH, may be normal variant  Borderline Abnormal ECG  When compared with ECG of 02-MAY-2022 08:31,  T wave inversion now evident in Inferior leads  Confirmed by Nishant Mccarthy MD ([id number]) on 11/15/2022 9:21:04 AM    Referred By: [email or web address]   SELF           Confirmed By:Nishant Mccarthy MD   Continue Losartan 100 mg daily, Metoprolol Succ 25 mg daily  DASH diet  Encouraged home blood pressure monitoring  - CBC Auto Differential; Future  - Comprehensive Metabolic Panel; Future  - TSH;  Future  - Urinalysis; Future  - Microalbumin/Creatinine Ratio, Urine; Future    3. Mixed hyperlipidemia  Continue Simvastatin 20 mg daily  Weight loss encouraged  Low fat/high fiber diet  Increase physical activity  Tobacco cessation encouraged  Cholesterol Total   Date Value Ref Range Status   10/12/2023 177 <=200 mg/dL Final     HDL Cholesterol   Date Value Ref Range Status   10/12/2023 46 35 - 60 mg/dL Final     Triglyceride   Date Value Ref Range Status   10/12/2023 135 37 - 140 mg/dL Final     LDL Cholesterol   Date Value Ref Range Status   10/12/2023 104.00 50.00 - 140.00 mg/dL Final   - Lipid Panel; Future    4. Class 3 severe obesity due to excess calories with serious comorbidity and body mass index (BMI) of 40.0 to 44.9 in adult  Body mass index is 38.47 kg/m².  TSH   Date Value Ref Range Status   08/22/2022 1.6622 0.3500 - 4.9400 uIU/mL Final     Vitamin D   Date Value Ref Range Status   10/12/2023 30.7 30.0 - 80.0 ng/mL Final     Hemoglobin A1c   Date Value Ref Range Status   10/18/2021 5.7 <<=7.0 % Final   Sleep Study: none noted  Weight Loss Encouraged  Increase Physical Activity    5. Vitamin D deficiency  Vitamin D level: 30.7  Vitamin D 50,000 units weekly  - Vitamin D; Future    6. Post-menopause  - DXA Bone Density Axial Skeleton 1 or more sites; Future    7. Encounter for breast cancer screening using non-mammogram modality  - MRI Breast w/wo Contrast, w/CAD, Bilateral; Future      Follow up in about 3 months (around 8/15/2024) for Labs. In addition to their scheduled follow up, the patient has also been instructed to follow up on as needed basis.     MARY Avalos

## 2024-05-20 ENCOUNTER — TELEPHONE (OUTPATIENT)
Dept: INTERNAL MEDICINE | Facility: CLINIC | Age: 69
End: 2024-05-20
Payer: MEDICARE

## 2024-05-20 DIAGNOSIS — G89.29 CHRONIC PAIN OF LEFT KNEE: Primary | ICD-10-CM

## 2024-05-20 DIAGNOSIS — M25.562 CHRONIC PAIN OF LEFT KNEE: Primary | ICD-10-CM

## 2024-05-20 NOTE — TELEPHONE ENCOUNTER
Please notify patient left knee X-ray indicated degenerative changes (arthritis). I can refer to PT if patient is not having relief from Meloxicam.

## 2024-08-06 ENCOUNTER — TELEPHONE (OUTPATIENT)
Dept: INTERNAL MEDICINE | Facility: CLINIC | Age: 69
End: 2024-08-06

## 2024-08-06 NOTE — TELEPHONE ENCOUNTER
LOV:5/15/24    NOV: 8/15/24      Pharmacy requesting refills on meloxicam (MOBIC) 15 MG tablet; unable to propose. Please advise.

## 2024-08-07 DIAGNOSIS — G89.29 CHRONIC PAIN OF LEFT KNEE: Primary | ICD-10-CM

## 2024-08-07 DIAGNOSIS — M25.562 CHRONIC PAIN OF LEFT KNEE: Primary | ICD-10-CM

## 2024-08-07 RX ORDER — MELOXICAM 15 MG/1
15 TABLET ORAL DAILY PRN
Qty: 10 TABLET | Refills: 0 | Status: SHIPPED | OUTPATIENT
Start: 2024-08-07 | End: 2024-08-17

## 2024-10-09 ENCOUNTER — HOSPITAL ENCOUNTER (EMERGENCY)
Facility: HOSPITAL | Age: 69
Discharge: HOME | End: 2024-10-09
Attending: EMERGENCY MEDICINE
Payer: MEDICARE

## 2024-10-09 VITALS
SYSTOLIC BLOOD PRESSURE: 174 MMHG | DIASTOLIC BLOOD PRESSURE: 80 MMHG | WEIGHT: 215.63 LBS | OXYGEN SATURATION: 98 % | BODY MASS INDEX: 42.33 KG/M2 | TEMPERATURE: 98 F | RESPIRATION RATE: 18 BRPM | HEIGHT: 60 IN | HEART RATE: 61 BPM

## 2024-10-09 DIAGNOSIS — G89.29 CHRONIC PAIN OF BOTH KNEES: Primary | ICD-10-CM

## 2024-10-09 DIAGNOSIS — M25.561 CHRONIC PAIN OF BOTH KNEES: Primary | ICD-10-CM

## 2024-10-09 DIAGNOSIS — M25.562 CHRONIC PAIN OF BOTH KNEES: Primary | ICD-10-CM

## 2024-10-09 PROCEDURE — 99283 EMERGENCY DEPT VISIT LOW MDM: CPT

## 2024-10-09 RX ORDER — MELOXICAM 15 MG/1
15 TABLET ORAL DAILY PRN
Qty: 12 TABLET | Refills: 0 | Status: SHIPPED | OUTPATIENT
Start: 2024-10-09

## 2024-10-09 NOTE — ED PROVIDER NOTES
Encounter Date: 10/9/2024       History     Chief Complaint   Patient presents with    Leg Pain     C/o bilateral leg pain x2 months. Hx of arthritis. No discoloration or loss of sensation.      The patient presents with bilateral knee pain. The onset was chronic increased for a week.  The course/duration of symptoms is constant. Type of injury: none and none known. Location: bilateral knees. The character of symptoms is pain  The degree at present is moderate. There are exacerbating factors including movement, weight bearing and walking.  The relieving factor is meloxicam. Risk factors consist of arthritis. Prior episodes: chronic. Therapy today: none. Associated symptoms: none. She requests refill of meloxicam.      Review of patient's allergies indicates:   Allergen Reactions    Iodine     Morphine      HIVES    Opioids - morphine analogues      Pt informed post surgery by MD that she is allergic to morphine    Iodine and iodide containing products Rash     Past Medical History:   Diagnosis Date    Hyperlipidemia     Hypertension      Past Surgical History:   Procedure Laterality Date    BREAST SURGERY       Family History   Problem Relation Name Age of Onset    Hypertension Mother      Cancer Father      Breast cancer Sister       Social History     Tobacco Use    Smoking status: Never    Smokeless tobacco: Never   Substance Use Topics    Alcohol use: Never    Drug use: Never     Review of Systems   Constitutional:  Negative for fever.   HENT:  Negative for sore throat.    Respiratory:  Negative for shortness of breath.    Cardiovascular:  Negative for chest pain.   Gastrointestinal:  Negative for nausea.   Genitourinary:  Negative for dysuria.   Musculoskeletal:  Positive for arthralgias. Negative for back pain.   Skin:  Negative for rash.   Neurological:  Negative for weakness.   Hematological:  Does not bruise/bleed easily.   All other systems reviewed and are negative.      Physical Exam     Initial Vitals  [10/09/24 0841]   BP Pulse Resp Temp SpO2   (!) 174/80 61 18 97.7 °F (36.5 °C) 98 %      MAP       --         Physical Exam    Nursing note and vitals reviewed.  Constitutional: She appears well-developed and well-nourished.   HENT:   Head: Normocephalic and atraumatic.   Neck: Neck supple.   Normal range of motion.  Cardiovascular:  Normal rate, regular rhythm, normal heart sounds and intact distal pulses.           Pulmonary/Chest: Effort normal and breath sounds normal.   Abdominal: Abdomen is soft and flat. Bowel sounds are normal. There is no abdominal tenderness.   Musculoskeletal:         General: Normal range of motion.      Cervical back: Normal range of motion and neck supple.      Comments: Bilateral Knees: mild ttp without swelling, FROM, good distal pulses, NVI, no calf ttp or swelling      Neurological: She is alert. She has normal strength.   Skin: Skin is warm and dry.   Psychiatric: She has a normal mood and affect.         ED Course   Procedures  Labs Reviewed - No data to display       Imaging Results    None          Medications - No data to display  Medical Decision Making  The patient presents with bilateral knee pain. The onset was chronic increased for a week.  The course/duration of symptoms is constant. Type of injury: none and none known. Location: bilateral knees. The character of symptoms is pain  The degree at present is moderate. There are exacerbating factors including movement, weight bearing and walking.  The relieving factor is meloxicam. Risk factors consist of arthritis. Prior episodes: chronic. Therapy today: none. Associated symptoms: none. She requests refill of meloxicam.    She declines pain medication in the ER. She requests refill of meloxicam. She will f/u with her pcp. 8:50 AM DISPOSITION: The patient is resting comfortably in no acute distress.  She is hemodynamically stable and is without objective evidence for acute process requiring urgent intervention or  hospitalization. I provided counseling to patient with regard to condition, the treatment plan, specific conditions for return, and the importance of follow up. Detailed written and verbal instructions provided to patient and she expressed a verbal understanding of the discharge instructions and overall management plan. Reiterated the importance of medication administration and safety and advised patient to follow up with primary care provider in 3-5 days or sooner if needed.  Answered questions at this time. The patient is stable for discharge.         Additional MDM:   Differential Diagnosis:   At this time differential diagnosis is but not limited to fracture, sprain, contusion, arthritis              ED Course as of 10/09/24 0851   Wed Oct 09, 2024   0849 Given strict ED return precautions. I have spoken with the patient and/or caregivers. I have explained the patient's condition, diagnoses and treatment plan based on the information available to me at this time. I have answered the patient's and/or caregiver's questions and addressed any concerns. The patient and/or caregivers have as good an understanding of the patient's diagnosis, condition and treatment plan as can be expected at this point. The vital signs have been stable. The patient's condition is stable and appropriate for discharge from the emergency department.      The patient will pursue further outpatient evaluation with the primary care physician or other designated or consulting physician as outlined in the discharge instructions. The patient and/or caregivers are agreeable to this plan of care and follow-up instructions have been explained in detail. The patient and/or caregivers have received these instructions in written format and have expressed an understanding of the discharge instructions. The patient and/or caregivers are aware that any significant change in condition or worsening of symptoms should prompt an immediate return to this or  the closest emergency department or a call to 911.      [RB]      ED Course User Index  [RB] Jitendra Miles ACNP                           Clinical Impression:  Final diagnoses:  [M25.561, M25.562, G89.29] Chronic pain of both knees (Primary)          ED Disposition Condition    Discharge Stable          ED Prescriptions       Medication Sig Dispense Start Date End Date Auth. Provider    meloxicam (MOBIC) 15 MG tablet Take 1 tablet (15 mg total) by mouth daily as needed for Pain. Don't take with ibuprofen, motrin, aleve, naprosyn or any other NSAID. 12 tablet 10/9/2024 -- Jitendra Miles ACNP          Follow-up Information       Follow up With Specialties Details Why Contact Info    follow up with your primary care provider in 3-5 days        [facility] Emergency Medicine  If symptoms worsen [address] [facility] [id number] [phone]             Jitendra Miles ACNP  10/09/24 0852

## 2024-12-17 ENCOUNTER — HOSPITAL ENCOUNTER (EMERGENCY)
Facility: HOSPITAL | Age: 69
Discharge: HOME | End: 2024-12-17
Attending: INTERNAL MEDICINE
Payer: MEDICARE

## 2024-12-17 VITALS
DIASTOLIC BLOOD PRESSURE: 84 MMHG | HEIGHT: 64 IN | RESPIRATION RATE: 20 BRPM | SYSTOLIC BLOOD PRESSURE: 155 MMHG | OXYGEN SATURATION: 96 % | TEMPERATURE: 97 F | BODY MASS INDEX: 36.66 KG/M2 | WEIGHT: 214.75 LBS | HEART RATE: 67 BPM

## 2024-12-17 DIAGNOSIS — R42 DIZZINESS: ICD-10-CM

## 2024-12-17 DIAGNOSIS — I10 UNCONTROLLED HYPERTENSION: ICD-10-CM

## 2024-12-17 DIAGNOSIS — M17.0 PRIMARY OSTEOARTHRITIS OF BOTH KNEES: Primary | ICD-10-CM

## 2024-12-17 LAB
ALBUMIN SERPL-MCNC: 3.8 G/DL (ref 3.4–4.8)
ALBUMIN/GLOB SERPL: 1.1 RATIO (ref 1.1–2)
ALP SERPL-CCNC: 91 UNIT/L (ref 40–150)
ALT SERPL-CCNC: 17 UNIT/L (ref 0–55)
ANION GAP SERPL CALC-SCNC: 5 MEQ/L
AST SERPL-CCNC: 20 UNIT/L (ref 5–34)
BASOPHILS # BLD AUTO: 0.06 X10(3)/MCL
BASOPHILS NFR BLD AUTO: 0.9 %
BILIRUB SERPL-MCNC: 0.4 MG/DL
BUN SERPL-MCNC: 11.2 MG/DL (ref 9.8–20.1)
CALCIUM SERPL-MCNC: 10.1 MG/DL (ref 8.4–10.2)
CHLORIDE SERPL-SCNC: 109 MMOL/L (ref 98–107)
CO2 SERPL-SCNC: 25 MMOL/L (ref 23–31)
CREAT SERPL-MCNC: 0.86 MG/DL (ref 0.55–1.02)
CREAT/UREA NIT SERPL: 13
EOSINOPHIL # BLD AUTO: 0.07 X10(3)/MCL (ref 0–0.9)
EOSINOPHIL NFR BLD AUTO: 1.1 %
ERYTHROCYTE [DISTWIDTH] IN BLOOD BY AUTOMATED COUNT: 13.5 % (ref 11.5–17)
GFR SERPLBLD CREATININE-BSD FMLA CKD-EPI: >60 ML/MIN/1.73/M2
GLOBULIN SER-MCNC: 3.6 GM/DL (ref 2.4–3.5)
GLUCOSE SERPL-MCNC: 86 MG/DL (ref 82–115)
HCT VFR BLD AUTO: 40.7 % (ref 37–47)
HGB BLD-MCNC: 13 G/DL (ref 12–16)
HOLD SPECIMEN: NORMAL
IMM GRANULOCYTES # BLD AUTO: 0 X10(3)/MCL (ref 0–0.04)
IMM GRANULOCYTES NFR BLD AUTO: 0 %
LYMPHOCYTES # BLD AUTO: 2.45 X10(3)/MCL (ref 0.6–4.6)
LYMPHOCYTES NFR BLD AUTO: 38.5 %
MCH RBC QN AUTO: 29.6 PG (ref 27–31)
MCHC RBC AUTO-ENTMCNC: 31.9 G/DL (ref 33–36)
MCV RBC AUTO: 92.7 FL (ref 80–94)
MONOCYTES # BLD AUTO: 0.71 X10(3)/MCL (ref 0.1–1.3)
MONOCYTES NFR BLD AUTO: 11.2 %
NEUTROPHILS # BLD AUTO: 3.07 X10(3)/MCL (ref 2.1–9.2)
NEUTROPHILS NFR BLD AUTO: 48.3 %
NRBC BLD AUTO-RTO: 0 %
OHS QRS DURATION: 94 MS
OHS QTC CALCULATION: 418 MS
PLATELET # BLD AUTO: 298 X10(3)/MCL (ref 130–400)
PMV BLD AUTO: 10.2 FL (ref 7.4–10.4)
POTASSIUM SERPL-SCNC: 3.7 MMOL/L (ref 3.5–5.1)
PROT SERPL-MCNC: 7.4 GM/DL (ref 5.8–7.6)
RBC # BLD AUTO: 4.39 X10(6)/MCL (ref 4.2–5.4)
SODIUM SERPL-SCNC: 139 MMOL/L (ref 136–145)
TROPONIN I SERPL-MCNC: <0.01 NG/ML (ref 0–0.04)
WBC # BLD AUTO: 6.36 X10(3)/MCL (ref 4.5–11.5)

## 2024-12-17 PROCEDURE — 85025 COMPLETE CBC W/AUTO DIFF WBC: CPT | Performed by: INTERNAL MEDICINE

## 2024-12-17 PROCEDURE — 84484 ASSAY OF TROPONIN QUANT: CPT | Performed by: INTERNAL MEDICINE

## 2024-12-17 PROCEDURE — 96372 THER/PROPH/DIAG INJ SC/IM: CPT | Performed by: INTERNAL MEDICINE

## 2024-12-17 PROCEDURE — 93005 ELECTROCARDIOGRAM TRACING: CPT

## 2024-12-17 PROCEDURE — 99284 EMERGENCY DEPT VISIT MOD MDM: CPT | Mod: 25

## 2024-12-17 PROCEDURE — 80053 COMPREHEN METABOLIC PANEL: CPT | Performed by: INTERNAL MEDICINE

## 2024-12-17 PROCEDURE — 63600175 PHARM REV CODE 636 W HCPCS: Performed by: INTERNAL MEDICINE

## 2024-12-17 RX ORDER — KETOROLAC TROMETHAMINE 30 MG/ML
15 INJECTION, SOLUTION INTRAMUSCULAR; INTRAVENOUS
Status: COMPLETED | OUTPATIENT
Start: 2024-12-17 | End: 2024-12-17

## 2024-12-17 RX ORDER — MELOXICAM 15 MG/1
15 TABLET ORAL DAILY PRN
Qty: 30 TABLET | Refills: 0 | Status: SHIPPED | OUTPATIENT
Start: 2024-12-17

## 2024-12-17 RX ADMIN — KETOROLAC TROMETHAMINE 15 MG: 30 INJECTION, SOLUTION INTRAMUSCULAR; INTRAVENOUS at 09:12

## 2024-12-17 NOTE — ED PROVIDER NOTES
"Encounter Date: 12/17/2024       History     Chief Complaint   Patient presents with    Leg Pain    Dizziness     Presents from home with c/o flare up of chronic bilat leg pain/arthritis. Also reports dizziness x3 weeks.      Patient is 69 year old woman with history of HTN, HLD, and chronic knee pain who presents for evaluation of bilateral knee pain, dizziness, and blurry vision. Patient reports that she has had knee pain for months/years and has been diagnosed with osteoarthritis in the past. She last received meloxicam for the pain and found great relief with this medication. She reports that she ran out of the medication about a month ago and has had persistent knee pain since. Describes the pain as bilateral aching that makes her feel dizzy, "weak like she is going to fall." Patient denies recent falls and normally walks without support. Reports she only feels the dizziness when the knee pain is bad. Reports its worst in the morning and she experiences knee stiffness in the morning.     Denies nausea, vomiting, diarrhea, constipation, cough, sneeze, fevers, loss of consciousness, falls, loss of balance, ringing in ears or ear pain.     The history is provided by the patient.     Review of patient's allergies indicates:   Allergen Reactions    Iodine     Morphine      HIVES    Opioids - morphine analogues      Pt informed post surgery by MD that she is allergic to morphine    Iodine and iodide containing products Rash     Past Medical History:   Diagnosis Date    Hyperlipidemia     Hypertension      Past Surgical History:   Procedure Laterality Date    BREAST SURGERY       Family History   Problem Relation Name Age of Onset    Hypertension Mother      Cancer Father      Breast cancer Sister       Social History     Tobacco Use    Smoking status: Never    Smokeless tobacco: Never   Substance Use Topics    Alcohol use: Never    Drug use: Never     Review of Systems   Eyes:  Positive for visual disturbance. "   Musculoskeletal:  Positive for arthralgias.   Neurological:  Positive for weakness.   All other systems reviewed and are negative.      Physical Exam     Initial Vitals [12/17/24 0835]   BP Pulse Resp Temp SpO2   (!) 158/87 62 20 97.7 °F (36.5 °C) 98 %      MAP       --         Physical Exam    Nursing note and vitals reviewed.  Constitutional: She appears well-developed and well-nourished.   HENT:   Head: Normocephalic and atraumatic.   Right Ear: External ear normal.   Left Ear: External ear normal. Mouth/Throat: Oropharynx is clear and moist.   Eyes: Conjunctivae and EOM are normal. Pupils are equal, round, and reactive to light. Right eye exhibits no discharge. Left eye exhibits no discharge. No scleral icterus.   Neck: Neck supple.   Normal range of motion.  Cardiovascular:  Normal rate, regular rhythm and normal heart sounds.           Pulmonary/Chest: Breath sounds normal. She has no wheezes. She has no rhonchi. She has no rales. She exhibits no tenderness.   Musculoskeletal:         General: Normal range of motion.      Cervical back: Normal range of motion and neck supple.     Neurological: She is alert and oriented to person, place, and time. She has normal strength. No cranial nerve deficit or sensory deficit.   Skin: Skin is warm and dry.   Psychiatric: She has a normal mood and affect. Her behavior is normal. Thought content normal.         ED Course   Procedures  Labs Reviewed   COMPREHENSIVE METABOLIC PANEL - Abnormal       Result Value    Sodium 139      Potassium 3.7      Chloride 109 (*)     CO2 25      Glucose 86      Blood Urea Nitrogen 11.2      Creatinine 0.86      Calcium 10.1      Protein Total 7.4      Albumin 3.8      Globulin 3.6 (*)     Albumin/Globulin Ratio 1.1      Bilirubin Total 0.4      ALP 91      ALT 17      AST 20      eGFR >60      Anion Gap 5.0      BUN/Creatinine Ratio 13     CBC WITH DIFFERENTIAL - Abnormal    WBC 6.36      RBC 4.39      Hgb 13.0      Hct 40.7      MCV 92.7       MCH 29.6      MCHC 31.9 (*)     RDW 13.5      Platelet 298      MPV 10.2      Neut % 48.3      Lymph % 38.5      Mono % 11.2      Eos % 1.1      Basophil % 0.9      Lymph # 2.45      Neut # 3.07      Mono # 0.71      Eos # 0.07      Baso # 0.06      IG# 0.00      IG% 0.0      NRBC% 0.0     TROPONIN I - Normal    Troponin-I <0.010     CBC W/ AUTO DIFFERENTIAL    Narrative:     The following orders were created for panel order CBC auto differential.  Procedure                               Abnormality         Status                     ---------                               -----------         ------                     CBC with Differential[[id number]]       Abnormal            Final result                 Please view results for these tests on the individual orders.   EXTRA TUBES    Narrative:     The following orders were created for panel order EXTRA TUBES.  Procedure                               Abnormality         Status                     ---------                               -----------         ------                     Light Blue Top Hold[[id number]]                             In process                 Red Top Hold[[id number]]                                    In process                 Light Green Top Hold[[id number]]                            In process                 Lavender Top Hold[[id number]]                               In process                 Gold Top Hold[[id number]]                                   In process                 Pink Top Hold[[id number]]                                   In process                   Please view results for these tests on the individual orders.   LIGHT BLUE TOP HOLD   RED TOP HOLD   LIGHT GREEN TOP HOLD   LAVENDER TOP HOLD   GOLD TOP HOLD   PINK TOP HOLD     EKG Readings: (Independently Interpreted)   Initial Reading: No STEMI. Rhythm: Normal Sinus Rhythm. Heart Rate: 62. Ectopy: No Ectopy. Conduction: Normal. ST Segments: Normal ST Segments. T  Waves: Normal. Axis: Normal. Clinical Impression: Normal Sinus Rhythm     ECG Results              EKG 12-lead (In process)        Collection Time Result Time QRS Duration [facility] QTC Calculation    12/17/24 08:45:46 12/17/24 08:47:34 94 418                     In process by Interface, Lab In [facility] (12/17/24 08:47:45)                   Narrative:    Test Reason : R42,    Vent. Rate :  62 BPM     Atrial Rate :  62 BPM     P-R Int : 134 ms          QRS Dur :  94 ms      QT Int : 412 ms       P-R-T Axes :  36   1  64 degrees    QTcB Int : 418 ms    Normal sinus rhythm  Normal ECG  When compared with ECG of 15-Nov-2022 08:57,  Non-specific change in ST segment in Inferior leads  T wave inversion no longer evident in Inferior leads  Nonspecific T wave abnormality now evident in Lateral leads    Referred By:            Confirmed By:                                   Imaging Results    None          Medications   ketorolac injection 15 mg (15 mg Intramuscular Given 12/17/24 0948)     Medical Decision Making  Amount and/or Complexity of Data Reviewed  Labs: ordered. Decision-making details documented in ED Course.  ECG/medicine tests: ordered and independent interpretation performed. Decision-making details documented in ED Course.    Risk  Prescription drug management.      Additional MDM:   Differential Diagnosis:   Peripheral vertigo, Central Vertigo, Cerumen impaction, orthostatic dizziness, Brain Mass, cardiac Dysrhythmias, among others                                      Clinical Impression:  Final diagnoses:  [R42] Dizziness  [M17.0] Primary osteoarthritis of both knees (Primary)  [I10] Uncontrolled hypertension          ED Disposition Condition    Discharge Stable          ED Prescriptions       Medication Sig Dispense Start Date End Date Auth. Provider    meloxicam (MOBIC) 15 MG tablet Take 1 tablet (15 mg total) by mouth daily as needed for Pain. Don't take with ibuprofen, motrin, aleve, naprosyn or any other  NSAID. 30 tablet 12/17/2024 -- Balta Mathew MD          Follow-up Information       Follow up With Specialties Details Why Contact Info    Sharon Rivera, NP Family Medicine Schedule an appointment as soon as possible for a visit in 2 weeks  [address] [facility] [address]  [phone]      [facility] Emergency Medicine  If symptoms worsen [address] [facility] [id number] [phone]             Balta Mathew MD  12/17/24 1002

## 2025-04-08 ENCOUNTER — OFFICE VISIT (OUTPATIENT)
Dept: INTERNAL MEDICINE | Facility: CLINIC | Age: 70
End: 2025-04-08
Payer: MEDICARE

## 2025-04-08 VITALS
OXYGEN SATURATION: 98 % | RESPIRATION RATE: 18 BRPM | SYSTOLIC BLOOD PRESSURE: 142 MMHG | BODY MASS INDEX: 36.3 KG/M2 | HEART RATE: 62 BPM | WEIGHT: 212.63 LBS | HEIGHT: 64 IN | DIASTOLIC BLOOD PRESSURE: 78 MMHG | TEMPERATURE: 98 F

## 2025-04-08 DIAGNOSIS — R73.02 IGT (IMPAIRED GLUCOSE TOLERANCE): ICD-10-CM

## 2025-04-08 DIAGNOSIS — E78.2 MIXED HYPERLIPIDEMIA: Chronic | ICD-10-CM

## 2025-04-08 DIAGNOSIS — M17.12 PRIMARY OSTEOARTHRITIS OF LEFT KNEE: ICD-10-CM

## 2025-04-08 DIAGNOSIS — E66.01 CLASS 2 SEVERE OBESITY WITH SERIOUS COMORBIDITY AND BODY MASS INDEX (BMI) OF 36.0 TO 36.9 IN ADULT, UNSPECIFIED OBESITY TYPE: ICD-10-CM

## 2025-04-08 DIAGNOSIS — E66.812 CLASS 2 SEVERE OBESITY WITH SERIOUS COMORBIDITY AND BODY MASS INDEX (BMI) OF 36.0 TO 36.9 IN ADULT, UNSPECIFIED OBESITY TYPE: ICD-10-CM

## 2025-04-08 DIAGNOSIS — E55.9 VITAMIN D DEFICIENCY: Chronic | ICD-10-CM

## 2025-04-08 DIAGNOSIS — G89.29 CHRONIC PAIN OF RIGHT KNEE: Chronic | ICD-10-CM

## 2025-04-08 DIAGNOSIS — M25.561 CHRONIC PAIN OF RIGHT KNEE: Chronic | ICD-10-CM

## 2025-04-08 DIAGNOSIS — K59.00 CONSTIPATION, UNSPECIFIED CONSTIPATION TYPE: ICD-10-CM

## 2025-04-08 DIAGNOSIS — I10 BENIGN HYPERTENSION: Primary | Chronic | ICD-10-CM

## 2025-04-08 DIAGNOSIS — R19.5 ABNORMAL STOOL TEST: ICD-10-CM

## 2025-04-08 PROBLEM — E66.9 OBESITY (BMI 35.0-39.9 WITHOUT COMORBIDITY): Status: ACTIVE | Noted: 2025-04-08

## 2025-04-08 PROBLEM — E66.9 OBESITY WITH SERIOUS COMORBIDITY: Status: ACTIVE | Noted: 2022-08-22

## 2025-04-08 PROBLEM — E66.9 OBESITY (BMI 35.0-39.9 WITHOUT COMORBIDITY): Chronic | Status: ACTIVE | Noted: 2022-08-22

## 2025-04-08 PROCEDURE — 1101F PT FALLS ASSESS-DOCD LE1/YR: CPT | Mod: CPTII,,, | Performed by: NURSE PRACTITIONER

## 2025-04-08 PROCEDURE — 3288F FALL RISK ASSESSMENT DOCD: CPT | Mod: CPTII,,, | Performed by: NURSE PRACTITIONER

## 2025-04-08 PROCEDURE — 99215 OFFICE O/P EST HI 40 MIN: CPT | Mod: S$PBB,,, | Performed by: NURSE PRACTITIONER

## 2025-04-08 PROCEDURE — 99215 OFFICE O/P EST HI 40 MIN: CPT | Mod: PBBFAC | Performed by: NURSE PRACTITIONER

## 2025-04-08 PROCEDURE — 3078F DIAST BP <80 MM HG: CPT | Mod: CPTII,,, | Performed by: NURSE PRACTITIONER

## 2025-04-08 PROCEDURE — 1159F MED LIST DOCD IN RCRD: CPT | Mod: CPTII,,, | Performed by: NURSE PRACTITIONER

## 2025-04-08 PROCEDURE — 4010F ACE/ARB THERAPY RXD/TAKEN: CPT | Mod: CPTII,,, | Performed by: NURSE PRACTITIONER

## 2025-04-08 PROCEDURE — 1126F AMNT PAIN NOTED NONE PRSNT: CPT | Mod: CPTII,,, | Performed by: NURSE PRACTITIONER

## 2025-04-08 PROCEDURE — 3008F BODY MASS INDEX DOCD: CPT | Mod: CPTII,,, | Performed by: NURSE PRACTITIONER

## 2025-04-08 PROCEDURE — 1160F RVW MEDS BY RX/DR IN RCRD: CPT | Mod: CPTII,,, | Performed by: NURSE PRACTITIONER

## 2025-04-08 PROCEDURE — 3077F SYST BP >= 140 MM HG: CPT | Mod: CPTII,,, | Performed by: NURSE PRACTITIONER

## 2025-04-08 RX ORDER — VIT C/E/ZN/COPPR/LUTEIN/ZEAXAN 250MG-90MG
2000 CAPSULE ORAL DAILY
Qty: 180 CAPSULE | Refills: 3 | Status: SHIPPED | OUTPATIENT
Start: 2025-04-08

## 2025-04-08 RX ORDER — SIMVASTATIN 20 MG/1
20 TABLET, FILM COATED ORAL NIGHTLY
Qty: 90 TABLET | Refills: 1 | Status: SHIPPED | OUTPATIENT
Start: 2025-04-08

## 2025-04-08 RX ORDER — LOSARTAN POTASSIUM 100 MG/1
100 TABLET ORAL DAILY
Qty: 90 TABLET | Refills: 2 | Status: SHIPPED | OUTPATIENT
Start: 2025-04-08

## 2025-04-08 RX ORDER — MELOXICAM 15 MG/1
15 TABLET ORAL DAILY PRN
Qty: 30 TABLET | Refills: 3 | Status: SHIPPED | OUTPATIENT
Start: 2025-04-08

## 2025-04-08 RX ORDER — METOPROLOL SUCCINATE 25 MG/1
25 TABLET, EXTENDED RELEASE ORAL DAILY
Qty: 90 TABLET | Refills: 1 | Status: SHIPPED | OUTPATIENT
Start: 2025-04-08

## 2025-04-08 NOTE — ASSESSMENT & PLAN NOTE
Stable. Relief with stool softener prn/ daily   Educated on slowly increasing fiber in diet to include fresh fruits and vegetables (squash, cabbage, lettuce, other greens, beans, and peas), whole grains and oats, nuts, and importance of adequate water intake (6-8 glasses of fluids daily).  Educated on health benefits of at least 5 days/ week of 30 minutes moderate intensity exercise (brisk walking) and 2 or more days/ week of muscle strength activities.  Daily Miralax with 6-8 oz water/juice  Okay to use otc stool softener

## 2025-04-08 NOTE — ASSESSMENT & PLAN NOTE
Lab Results   Component Value Date    [id number] 30.7 10/12/2023     Educated on increasing foods high in Vitamin D such as mushrooms, fish oil, cod liver, salmon, tuna, egg yolks, milk fortified with Vitamin D and foods fortified with Vitamin D such as cereals and oatmeal.  Daily supplementation of Vitamin D 2000 IU daily in addition to Calcium supplementation 1000 mg- 1200 mg daily.

## 2025-04-08 NOTE — PATIENT INSTRUCTIONS
You may call scheduling department to get scheduled for MRI breast and bone density scan- contact # [phone]

## 2025-04-08 NOTE — ASSESSMENT & PLAN NOTE
Lab Results   Component Value Date    CHOL 177 10/12/2023     Lab Results   Component Value Date    HDL 46 10/12/2023     Lab Results   Component Value Date    TRIG 135 10/12/2023     Lab Results   Component Value Date    .00 10/12/2023     Avoid tobacco/ alcohol  Follow low fat/low cholesterol diet such as avoid/ decrease wood, sausage, fried foods, cookies, cakes, chips, cheese, whole milk, butter, mayonnaise. Add olive oil, avocados, lean meats, fresh fruits/ vegetables, heart healthy nuts to diet.  Educated on health benefits of exercise 5 days/ week of at least 30 minutes moderate intensity exercise (brisk walking) and 2 days/ week of muscle strength activities  Daily ASA 81 mg for CV prevention  Continue current medication regimen  Labs due with f/u, not fasting today

## 2025-04-08 NOTE — ASSESSMENT & PLAN NOTE
Lab Results   Component Value Date    HGBA1C 5.7 10/18/2021     Previously A1c 5.9%  Recheck labs with f/u

## 2025-04-08 NOTE — PROGRESS NOTES
Sharon L Miguel, NP   [facility]  [address] [facility] [address]      PATIENT NAME: Rabia Valencia  : 1955  DATE: 25  MRN: [id number]        History of Present Illness / Problem Focused Workflow     Rabia Valencia presents to the clinic with Follow-up (HTN, med refill) and Establish Care     68 yo female to establish pcp care. Former patient of POLO Elam NP, last visit May 2024. PMH migraine HA, abnormal Cologuard. Active diagnosis includes HTN, HLD, vitamin D deficiency, constipation, OA, obesity. /78. Took medications immediately prior to visit. Asymptomatic. Does mention will forget to take some medications from time to time. Needs med refills. Continues with chronic b/l knee pain. Uses Meloxicam prn. Interested in PT. Chronic constipation and taking OTC stool softener with relief. Due for labs, not fasting today. Due for mmg and DEXA. -5 # loss since May 2024. Denies any fever, chills, HA, CP, SOB, abdominal pain, poor appetite, n/v/d, hematochezia, lower leg numbness or weakness or falls.     Screenings  MMG - due for MRI   DEXA - due   CRC - Cologuard abnormal , did not complete colonoscopy? Referred ordered again today     Other providers   [facility] GYN     Review of Systems     Review of Systems   Constitutional: Negative.    HENT: Negative.     Eyes: Negative.    Respiratory: Negative.     Cardiovascular: Negative.    Gastrointestinal:  Positive for constipation.   Endocrine: Negative.    Genitourinary: Negative.    Musculoskeletal:  Positive for arthralgias (b/l knee pain).   Skin: Negative.    Allergic/Immunologic: Negative.    Neurological: Negative.    Hematological: Negative.    Psychiatric/Behavioral: Negative.         Medical / Social / Family History     -------------------------------------    Hyperlipidemia    Hypertension        Past Surgical History:   Procedure Laterality Date    BREAST SURGERY    "      Social History[1]     Family History   Problem Relation Name Age of Onset    Hypertension Mother      Cancer Father      Breast cancer Sister      Cancer Sister          Medications and Allergies     Medications  Current Outpatient Medications   Medication Instructions    cholecalciferol (vitamin D3) (VITAMIN D3) 2,000 Units, Oral, Daily    fluticasone propionate (FLONASE) 50 mcg, Each Nostril, 2 times daily PRN    losartan (COZAAR) 100 mg, Oral, Daily    meloxicam (MOBIC) 15 mg, Oral, Daily PRN, Don't take with ibuprofen, motrin, aleve, naprosyn or any other NSAID.    metoprolol succinate (TOPROL-XL) 25 mg, Oral, Daily    simvastatin (ZOCOR) 20 mg, Oral, Nightly       Allergies  Review of patient's allergies indicates:   Allergen Reactions    Iodine     Morphine      HIVES    Opioids - morphine analogues      Pt informed post surgery by MD that she is allergic to morphine    Iodine and iodide containing products Rash       Physical Examination     Visit Vitals  BP (!) 142/78   Pulse 62   Temp 97.9 °F (36.6 °C) (Oral)   Resp 18   Ht 5' 4" (1.626 m)   Wt 96.4 kg (212 lb 9.6 oz)   SpO2 98%   BMI 36.49 kg/m²       Physical Exam  Vitals and nursing note reviewed.   Constitutional:       General: She is not in acute distress.     Appearance: Normal appearance. She is obese. She is not ill-appearing, toxic-appearing or diaphoretic.   HENT:      Head: Normocephalic.      Right Ear: Tympanic membrane, ear canal and external ear normal.      Left Ear: Tympanic membrane, ear canal and external ear normal.   Neck:      Thyroid: No thyroid mass, thyromegaly or thyroid tenderness.      Vascular: No carotid bruit.   Cardiovascular:      Rate and Rhythm: Normal rate and regular rhythm.      Pulses: Normal pulses.           Dorsalis pedis pulses are 2+ on the right side and 2+ on the left side.        Posterior tibial pulses are 2+ on the right side and 2+ on the left side.      Heart sounds: No murmur heard.  Pulmonary:      " Effort: Pulmonary effort is normal. No respiratory distress.      Breath sounds: Normal breath sounds. No stridor. No wheezing, rhonchi or rales.   Abdominal:      General: Bowel sounds are normal. There is no distension.      Palpations: Abdomen is soft.      Tenderness: There is no abdominal tenderness.   Musculoskeletal:         General: Normal range of motion.      Cervical back: Neck supple.      Right lower leg: No edema.      Left lower leg: No edema.   Lymphadenopathy:      Cervical: No cervical adenopathy.   Skin:     General: Skin is warm and dry.      Capillary Refill: Capillary refill takes less than 2 seconds.   Neurological:      Mental Status: She is alert and oriented to person, place, and time. Mental status is at baseline.      Motor: No weakness.      Coordination: Coordination normal.      Gait: Gait normal.   Psychiatric:         Mood and Affect: Mood normal.         Behavior: Behavior normal.         Thought Content: Thought content normal.         Judgment: Judgment normal.           Results     Lab Results   Component Value Date    WBC 6.36 12/17/2024    RBC 4.39 12/17/2024    HGB 13.0 12/17/2024    HCT 40.7 12/17/2024    MCV 92.7 12/17/2024    MCH 29.6 12/17/2024    MCHC 31.9 (L) 12/17/2024    RDW 13.5 12/17/2024     12/17/2024    MPV 10.2 12/17/2024     Sodium   Date Value Ref Range Status   12/17/2024 139 136 - 145 mmol/L Final     Potassium   Date Value Ref Range Status   12/17/2024 3.7 3.5 - 5.1 mmol/L Final     Chloride   Date Value Ref Range Status   12/17/2024 109 (H) 98 - 107 mmol/L Final     CO2   Date Value Ref Range Status   12/17/2024 25 23 - 31 mmol/L Final     Blood Urea Nitrogen   Date Value Ref Range Status   12/17/2024 11.2 9.8 - 20.1 mg/dL Final     Creatinine   Date Value Ref Range Status   12/17/2024 0.86 0.55 - 1.02 mg/dL Final     Calcium   Date Value Ref Range Status   12/17/2024 10.1 8.4 - 10.2 mg/dL Final     Albumin   Date Value Ref Range Status   12/17/2024  3.8 3.4 - 4.8 g/dL Final     Bilirubin Total   Date Value Ref Range Status   12/17/2024 0.4 <=1.5 mg/dL Final     ALP   Date Value Ref Range Status   12/17/2024 91 40 - 150 unit/L Final     AST   Date Value Ref Range Status   12/17/2024 20 5 - 34 unit/L Final     ALT   Date Value Ref Range Status   12/17/2024 17 0 - 55 unit/L Final     Estimated GFR-Non    Date Value Ref Range Status   05/02/2022 >60 mls/min/1.73/m2 Final     Lab Results   Component Value Date    CHOL 177 10/12/2023     Lab Results   Component Value Date    HDL 46 10/12/2023     Lab Results   Component Value Date    TRIG 135 10/12/2023     Lab Results   Component Value Date    .00 10/12/2023     Lab Results   Component Value Date    TSH 1.6622 08/22/2022     Lab Results   Component Value Date    PHUR 7.5 08/22/2022    PROTEINUA Negative 08/22/2022    GLUCUA Normal 08/22/2022    KETONESU Negative 09/23/2021    OCCULTUA Negative 08/22/2022    NITRITE Negative 08/22/2022    LEUKOCYTESUR Negative 08/22/2022     Lab Results   Component Value Date    HGBA1C 5.7 10/18/2021    HGBA1C 5.9 (H) 05/17/2017     Lab Results   Component Value Date    MICALBCREAT 372.1 (H) 10/12/2023        Assessment       ICD-10-CM ICD-9-CM   1. Benign hypertension  I10 401.1   2. Mixed hyperlipidemia  E78.2 272.2   3. Primary osteoarthritis of left knee  M17.12 715.16   4. Chronic pain of right knee  M25.561 719.46    G89.29 338.29   5. Vitamin D deficiency  E55.9 268.9   6. IGT (impaired glucose tolerance)  R73.02 790.22   7. Abnormal stool test  R19.5 792.1   8. Class 2 severe obesity with serious comorbidity and body mass index (BMI) of 36.0 to 36.9 in adult, unspecified obesity type  E66.812 278.01    Z68.36 V85.36    E66.01    9. Constipation, unspecified constipation type  K59.00 564.00       Plan       Problem List Items Addressed This Visit          Cardiac/Vascular    Benign hypertension - Primary (Chronic)    Current Assessment & Plan   BP  Readings from Last 3 Encounters:   04/08/25 (!) 142/78   12/17/24 (!) 155/84   10/09/24 (!) 174/80   Asymptomatic. Took medications immediately prior to visit     Follow low sodium diet, < 2 gm/day (avoid high salty foods such as processed meats/ sausage/wood/ sandwich meat, chips, pickles, cheese, crackers and soft drinks/ electrolyte replacement drinks).  Avoid tobacco/ alcohol use  Educated on health benefits of at least 5 days/ week of 30 minutes moderate intensity exercise (brisk walking) and 2 or more days/ week of muscle strength activities  Daily ASA 81 mg for CV prevention  Continue current medication regimen  F/u 2 mo; if remains elevated consider medication adjustment   Discussed improtance of taking medication same time daily and suggested use of pill box to help remind her of AM/PM medications as she does mention she has forgotten from time to time           Relevant Medications    losartan (COZAAR) 100 MG tablet    metoprolol succinate (TOPROL-XL) 25 MG 24 hr tablet    Other Relevant Orders    CBC Auto Differential    Comprehensive Metabolic Panel    Hemoglobin A1C    Lipid Panel    Microalbumin/Creatinine Ratio, Urine    TSH    Vitamin D    Urinalysis, Reflex to Urine Culture    Hyperlipidemia (Chronic)    Current Assessment & Plan   Lab Results   Component Value Date    CHOL 177 10/12/2023     Lab Results   Component Value Date    HDL 46 10/12/2023     Lab Results   Component Value Date    TRIG 135 10/12/2023     Lab Results   Component Value Date    .00 10/12/2023     Avoid tobacco/ alcohol  Follow low fat/low cholesterol diet such as avoid/ decrease wodo, sausage, fried foods, cookies, cakes, chips, cheese, whole milk, butter, mayonnaise. Add olive oil, avocados, lean meats, fresh fruits/ vegetables, heart healthy nuts to diet.  Educated on health benefits of exercise 5 days/ week of at least 30 minutes moderate intensity exercise (brisk walking) and 2 days/ week of muscle strength  activities  Daily ASA 81 mg for CV prevention  Continue current medication regimen  Labs due with f/u, not fasting today            Relevant Medications    simvastatin (ZOCOR) 20 MG tablet    Other Relevant Orders    CBC Auto Differential    Comprehensive Metabolic Panel    Hemoglobin A1C    Lipid Panel    Microalbumin/Creatinine Ratio, Urine    TSH    Vitamin D    Urinalysis, Reflex to Urine Culture       Endocrine    Vitamin D deficiency (Chronic)    Current Assessment & Plan   Lab Results   Component Value Date    [id number] 30.7 10/12/2023     Educated on increasing foods high in Vitamin D such as mushrooms, fish oil, cod liver, salmon, tuna, egg yolks, milk fortified with Vitamin D and foods fortified with Vitamin D such as cereals and oatmeal.  Daily supplementation of Vitamin D 2000 IU daily in addition to Calcium supplementation 1000 mg- 1200 mg daily.           Relevant Medications    cholecalciferol, vitamin D3, (VITAMIN D3) 25 mcg (1,000 unit) capsule    Other Relevant Orders    Comprehensive Metabolic Panel    TSH    Vitamin D    Class 2 severe obesity with serious comorbidity and body mass index (BMI) of 36.0 to 36.9 in adult    Current Assessment & Plan   BMI 36.49  See HTN, HLD, OA, vit D def  Educated on increased risk of disease s/t obesity.  Educated on health benefits of at least 5 days/ week of 30 minutes moderate intensity exercise (brisk walking) and 2 or more days/ week of muscle strength activities (as tolerated).  Eat well balanced diet of fresh fruits/ vegetables, whole grains, lean meats and limit high carbohydrate foods.            IGT (impaired glucose tolerance)    Current Assessment & Plan   Lab Results   Component Value Date    HGBA1C 5.7 10/18/2021     Previously A1c 5.9%  Recheck labs with f/u          Relevant Orders    Hemoglobin A1C       GI    Abnormal stool test    Current Assessment & Plan   Lab Results   Component Value Date    CLAIR Positive (A) 09/19/2022     Was  referred to [facility] for colonoscopy and do not see this has been completed  Referred to [facility] lab for colonoscopy          Relevant Orders    Ambulatory referral/consult to Endo Procedure     Constipation    Current Assessment & Plan   Stable. Relief with stool softener prn/ daily   Educated on slowly increasing fiber in diet to include fresh fruits and vegetables (squash, cabbage, lettuce, other greens, beans, and peas), whole grains and oats, nuts, and importance of adequate water intake (6-8 glasses of fluids daily).  Educated on health benefits of at least 5 days/ week of 30 minutes moderate intensity exercise (brisk walking) and 2 or more days/ week of muscle strength activities.  Daily Miralax with 6-8 oz water/juice  Okay to use otc stool softener               Orthopedic    Chronic pain of right knee (Chronic)    Current Assessment & Plan   See OA left knee          Relevant Medications    meloxicam (MOBIC) 15 MG tablet    Other Relevant Orders    Ambulatory Referral/Consult to Physical Therapy    Primary osteoarthritis of left knee    Current Assessment & Plan   Stable. Encouraged RICE. Rx Meloxicam as directed          Relevant Medications    meloxicam (MOBIC) 15 MG tablet    Other Relevant Orders    Ambulatory Referral/Consult to Physical Therapy       Future Appointments   Date Time Provider Department Center   5/9/2025  9:30 AM Diana Decker, ANP [facility] GYN [facility]   6/26/2025  9:40 AM Sharon Rivera, NP [facility]      I spent a total of 51 minutes on the day of the visit.  This includes face to face time and non-face to face time preparing to see the patient (eg, review of tests), obtaining and/or reviewing separately obtained history, documenting clinical information in the electronic or other health record, independently interpreting results and communicating results to the patient/family/caregiver, or care coordinator.    Follow up in about 2 months (around 6/8/2025)  for wellness with fasting labs before visit.    Signature:  Sharon Rivera NP  [facility]  [address] [facility] [address]    Date of encounter: 4/8/25         [1]   Social History  Socioeconomic History    Marital status: Single    Number of children: 2   Tobacco Use    Smoking status: Never    Smokeless tobacco: Never   Substance and Sexual Activity    Alcohol use: Never    Drug use: Never    Sexual activity: Not Currently     Partners: Male     Social Drivers of Health     Financial Resource Strain: Low Risk  (4/6/2023)    Overall Financial Resource Strain (CARDIA)     Difficulty of Paying Living Expenses: Not hard at all   Food Insecurity: No Food Insecurity (4/6/2023)    Hunger Vital Sign     Worried About Running Out of Food in the Last Year: Never true     Ran Out of Food in the Last Year: Never true   Transportation Needs: No Transportation Needs (4/6/2023)    PRAPARE - Transportation     Lack of Transportation (Medical): No     Lack of Transportation (Non-Medical): No   Physical Activity: Inactive (4/6/2023)    Exercise Vital Sign     Days of Exercise per Week: 0 days     Minutes of Exercise per Session: 0 min   Stress: No Stress Concern Present (4/6/2023)    [employer] [employer] - Occupational Stress Questionnaire     Feeling of Stress : Only a little   Housing Stability: Low Risk  (4/6/2023)    Housing Stability Vital Sign     Unable to Pay for Housing in the Last Year: No     Number of Places Lived in the Last Year: 1     Unstable Housing in the Last Year: No

## 2025-04-08 NOTE — ASSESSMENT & PLAN NOTE
BP Readings from Last 3 Encounters:   04/08/25 (!) 142/78   12/17/24 (!) 155/84   10/09/24 (!) 174/80   Asymptomatic. Took medications immediately prior to visit     Follow low sodium diet, < 2 gm/day (avoid high salty foods such as processed meats/ sausage/wood/ sandwich meat, chips, pickles, cheese, crackers and soft drinks/ electrolyte replacement drinks).  Avoid tobacco/ alcohol use  Educated on health benefits of at least 5 days/ week of 30 minutes moderate intensity exercise (brisk walking) and 2 or more days/ week of muscle strength activities  Daily ASA 81 mg for CV prevention  Continue current medication regimen  F/u 2 mo; if remains elevated consider medication adjustment   Discussed improtance of taking medication same time daily and suggested use of pill box to help remind her of AM/PM medications as she does mention she has forgotten from time to time

## 2025-04-08 NOTE — ASSESSMENT & PLAN NOTE
BMI 36.49  See HTN, HLD, OA, vit D def  Educated on increased risk of disease s/t obesity.  Educated on health benefits of at least 5 days/ week of 30 minutes moderate intensity exercise (brisk walking) and 2 or more days/ week of muscle strength activities (as tolerated).  Eat well balanced diet of fresh fruits/ vegetables, whole grains, lean meats and limit high carbohydrate foods.

## 2025-06-19 ENCOUNTER — HOSPITAL ENCOUNTER (EMERGENCY)
Facility: HOSPITAL | Age: 70
Discharge: HOME | End: 2025-06-19
Attending: EMERGENCY MEDICINE
Payer: MEDICARE

## 2025-06-19 VITALS
SYSTOLIC BLOOD PRESSURE: 159 MMHG | BODY MASS INDEX: 42.36 KG/M2 | HEIGHT: 59 IN | TEMPERATURE: 98 F | OXYGEN SATURATION: 98 % | WEIGHT: 210.13 LBS | HEART RATE: 88 BPM | DIASTOLIC BLOOD PRESSURE: 88 MMHG | RESPIRATION RATE: 18 BRPM

## 2025-06-19 DIAGNOSIS — U07.1 COVID-19: Primary | ICD-10-CM

## 2025-06-19 LAB
FLUAV AG UPPER RESP QL IA.RAPID: NOT DETECTED
FLUBV AG UPPER RESP QL IA.RAPID: NOT DETECTED
SARS-COV-2 RNA RESP QL NAA+PROBE: DETECTED

## 2025-06-19 PROCEDURE — 99283 EMERGENCY DEPT VISIT LOW MDM: CPT

## 2025-06-19 PROCEDURE — 0240U COVID/FLU A&B PCR: CPT | Performed by: EMERGENCY MEDICINE

## 2025-06-19 NOTE — ED PROVIDER NOTES
Encounter Date: 6/19/2025       History     Chief Complaint   Patient presents with    Facial Pain    Otalgia    Cough     Sinus pressure, bilateral otalgia, cough, night sweats x 2 days.     Months of symptoms, today is the 3rd or 4th day, no known ill contacts but there is still significant COVID prevalence in the community.  She describes sinus pressure bilaterally, mild bilateral otalgia, mild cough, mild night sweats, mild subjective fever and chills, stuffy nose, runny nose.  No dyspnea, sputum production, hemoptysis, or lower extremity swelling.  No other complaints.    The history is provided by the patient. No  was used.     Review of patient's allergies indicates:   Allergen Reactions    Iodine     Morphine      HIVES    Opioids - morphine analogues      Pt informed post surgery by MD that she is allergic to morphine    Iodine and iodide containing products Rash     Past Medical History:   Diagnosis Date    Hyperlipidemia     Hypertension      Past Surgical History:   Procedure Laterality Date    BREAST SURGERY       Family History   Problem Relation Name Age of Onset    Hypertension Mother      Cancer Father      Breast cancer Sister      Cancer Sister       Social History[1]  Review of Systems   Constitutional:  Positive for chills, diaphoresis and fever.   HENT:  Positive for congestion, postnasal drip, rhinorrhea and sinus pressure.    Respiratory:  Positive for cough.        Physical Exam     Initial Vitals [06/19/25 0709]   BP Pulse Resp Temp SpO2   (!) 159/88 88 18 98.3 °F (36.8 °C) 98 %      MAP       --         Physical Exam    Nursing note and vitals reviewed.  Constitutional: She appears well-developed and well-nourished. No distress.   No distress.  Mildly overweight.   HENT:   Head: Normocephalic and atraumatic.   Right Ear: External ear normal.   Left Ear: External ear normal.   Nose: Nose normal. Mouth/Throat: Oropharynx is clear and moist. No oropharyngeal exudate.   No  sinus tenderness   Eyes: EOM are normal. Pupils are equal, round, and reactive to light.   Neck: Neck supple.   Normal range of motion.  Cardiovascular:  Normal rate and regular rhythm.           Pulmonary/Chest: Breath sounds normal. No respiratory distress.   Lungs clear, no dyspnea   Abdominal: Abdomen is soft. Bowel sounds are normal.   Musculoskeletal:         General: No tenderness. Normal range of motion.      Cervical back: Normal range of motion and neck supple.     Neurological: She is alert and oriented to person, place, and time. She has normal strength.   Skin: Skin is warm and dry.   Psychiatric: She has a normal mood and affect. Thought content normal.         ED Course   Procedures  Labs Reviewed   COVID/FLU A&B PCR - Abnormal       Result Value    Influenza A PCR Not Detected      Influenza B PCR Not Detected      SARS-CoV-2 PCR Detected (*)     Narrative:     The Xpert Xpress SARS-CoV-2/FLU/RSV plus is a rapid, multiplexed real-time PCR test intended for the simultaneous qualitative detection and differentiation of SARS-CoV-2, Influenza A, Influenza B, and respiratory syncytial virus (RSV) viral RNA in either nasopharyngeal swab or nasal swab specimens.                Imaging Results    None          Medications - No data to display    9:25 AM Counseled; d/c instructions:        As suspected, your COVID test is positive.      Fortunately, you are not showing any signs of serious complications.      Rest, routine over-the-counter medications, follow routine contagious precautions, return as needed.      No prescription medications necessary.          Medical Decision Making  Minor typical outpatient COVID without signs of complication.    Problems Addressed:  COVID-19: acute illness or injury    Amount and/or Complexity of Data Reviewed  Labs: ordered. Decision-making details documented in ED Course.    Risk  OTC drugs.      Additional MDM:   Differential Diagnosis:   COVID, flu, other viral upper  respiratory infection among many others                                    Clinical Impression:  Final diagnoses:  [U07.1] COVID-19 (Primary)          ED Disposition Condition    Discharge Stable          ED Prescriptions    None       Follow-up Information       Follow up With Specialties Details Why Contact Info    [facility] Emergency Medicine  As needed [address] [address] [phone]    Sharon Rivera, NP Family Medicine  As needed [address] [facility] [address] [phone]                     [1]   Social History  Tobacco Use    Smoking status: Never    Smokeless tobacco: Never   Substance Use Topics    Alcohol use: Never    Drug use: Never        Irving Tay MD  06/19/25 0923

## 2025-06-19 NOTE — DISCHARGE INSTRUCTIONS
As suspected, your COVID test is positive.      Fortunately, you are not showing any signs of serious complications.      Rest, routine over-the-counter medications, follow routine contagious precautions, return as needed.      No prescription medications necessary.

## 2025-06-26 ENCOUNTER — OFFICE VISIT (OUTPATIENT)
Dept: INTERNAL MEDICINE | Facility: CLINIC | Age: 70
End: 2025-06-26
Payer: MEDICARE

## 2025-06-26 ENCOUNTER — LAB VISIT (OUTPATIENT)
Dept: LAB | Facility: HOSPITAL | Age: 70
End: 2025-06-26
Attending: NURSE PRACTITIONER
Payer: MEDICARE

## 2025-06-26 VITALS
HEIGHT: 59 IN | BODY MASS INDEX: 42.17 KG/M2 | SYSTOLIC BLOOD PRESSURE: 138 MMHG | DIASTOLIC BLOOD PRESSURE: 87 MMHG | WEIGHT: 209.19 LBS | HEART RATE: 62 BPM | OXYGEN SATURATION: 97 % | RESPIRATION RATE: 16 BRPM | TEMPERATURE: 98 F

## 2025-06-26 DIAGNOSIS — E55.9 VITAMIN D DEFICIENCY: Chronic | ICD-10-CM

## 2025-06-26 DIAGNOSIS — E66.813 CLASS 3 SEVERE OBESITY DUE TO EXCESS CALORIES WITH SERIOUS COMORBIDITY AND BODY MASS INDEX (BMI) OF 40.0 TO 44.9 IN ADULT: ICD-10-CM

## 2025-06-26 DIAGNOSIS — E78.2 MIXED HYPERLIPIDEMIA: Chronic | ICD-10-CM

## 2025-06-26 DIAGNOSIS — R63.4 WEIGHT LOSS, UNINTENTIONAL: Primary | ICD-10-CM

## 2025-06-26 DIAGNOSIS — I10 BENIGN HYPERTENSION: ICD-10-CM

## 2025-06-26 DIAGNOSIS — R73.02 IGT (IMPAIRED GLUCOSE TOLERANCE): ICD-10-CM

## 2025-06-26 DIAGNOSIS — E66.9 OBESITY (BMI 35.0-39.9 WITHOUT COMORBIDITY): ICD-10-CM

## 2025-06-26 DIAGNOSIS — R19.5 ABNORMAL STOOL TEST: ICD-10-CM

## 2025-06-26 DIAGNOSIS — E78.2 MIXED HYPERLIPIDEMIA: ICD-10-CM

## 2025-06-26 DIAGNOSIS — U07.1 COVID: ICD-10-CM

## 2025-06-26 DIAGNOSIS — E66.01 CLASS 3 SEVERE OBESITY DUE TO EXCESS CALORIES WITH SERIOUS COMORBIDITY AND BODY MASS INDEX (BMI) OF 40.0 TO 44.9 IN ADULT: ICD-10-CM

## 2025-06-26 DIAGNOSIS — I10 BENIGN HYPERTENSION: Chronic | ICD-10-CM

## 2025-06-26 DIAGNOSIS — N64.4 BREAST PAIN, LEFT: ICD-10-CM

## 2025-06-26 DIAGNOSIS — Z78.0 POSTMENOPAUSE: ICD-10-CM

## 2025-06-26 DIAGNOSIS — R73.03 PREDIABETES: ICD-10-CM

## 2025-06-26 DIAGNOSIS — Z12.31 VISIT FOR SCREENING MAMMOGRAM: ICD-10-CM

## 2025-06-26 LAB
25(OH)D3+25(OH)D2 SERPL-MCNC: 31 NG/ML (ref 30–80)
ALBUMIN SERPL-MCNC: 3.6 G/DL (ref 3.4–4.8)
ALBUMIN/CREAT UR: 78.1 MG/GM CR (ref 0–30)
ALBUMIN/GLOB SERPL: 0.9 RATIO (ref 1.1–2)
ALP SERPL-CCNC: 90 UNIT/L (ref 40–150)
ALT SERPL-CCNC: 13 UNIT/L (ref 0–55)
ANION GAP SERPL CALC-SCNC: 8 MEQ/L
AST SERPL-CCNC: 17 UNIT/L (ref 11–45)
BACTERIA #/AREA URNS AUTO: ABNORMAL /HPF
BASOPHILS # BLD AUTO: 0.04 X10(3)/MCL
BASOPHILS NFR BLD AUTO: 0.6 %
BILIRUB SERPL-MCNC: 0.5 MG/DL
BILIRUB UR QL STRIP.AUTO: NEGATIVE
BUN SERPL-MCNC: 15.7 MG/DL (ref 9.8–20.1)
CALCIUM SERPL-MCNC: 9.4 MG/DL (ref 8.4–10.2)
CHLORIDE SERPL-SCNC: 108 MMOL/L (ref 98–107)
CHOLEST SERPL-MCNC: 177 MG/DL
CHOLEST/HDLC SERPL: 4 {RATIO} (ref 0–5)
CLARITY UR: ABNORMAL
CO2 SERPL-SCNC: 26 MMOL/L (ref 23–31)
COLOR UR AUTO: YELLOW
CREAT SERPL-MCNC: 0.87 MG/DL (ref 0.55–1.02)
CREAT UR-MCNC: 245.1 MG/DL (ref 45–106)
CREAT/UREA NIT SERPL: 18
EOSINOPHIL # BLD AUTO: 0.13 X10(3)/MCL (ref 0–0.9)
EOSINOPHIL NFR BLD AUTO: 2 %
ERYTHROCYTE [DISTWIDTH] IN BLOOD BY AUTOMATED COUNT: 13.2 % (ref 11.5–17)
EST. AVERAGE GLUCOSE BLD GHB EST-MCNC: 125.5 MG/DL
GFR SERPLBLD CREATININE-BSD FMLA CKD-EPI: >60 ML/MIN/1.73/M2
GLOBULIN SER-MCNC: 4 GM/DL (ref 2.4–3.5)
GLUCOSE SERPL-MCNC: 116 MG/DL (ref 82–115)
GLUCOSE UR QL STRIP: NORMAL
HBA1C MFR BLD: 6 %
HCT VFR BLD AUTO: 41.6 % (ref 37–47)
HDLC SERPL-MCNC: 41 MG/DL (ref 35–60)
HGB BLD-MCNC: 13.2 G/DL (ref 12–16)
HGB UR QL STRIP: NEGATIVE
HYALINE CASTS #/AREA URNS LPF: ABNORMAL /LPF
IMM GRANULOCYTES # BLD AUTO: 0.02 X10(3)/MCL (ref 0–0.04)
IMM GRANULOCYTES NFR BLD AUTO: 0.3 %
KETONES UR QL STRIP: NEGATIVE
LDLC SERPL CALC-MCNC: 106 MG/DL (ref 50–140)
LEUKOCYTE ESTERASE UR QL STRIP: 25
LYMPHOCYTES # BLD AUTO: 2.37 X10(3)/MCL (ref 0.6–4.6)
LYMPHOCYTES NFR BLD AUTO: 37.3 %
MCH RBC QN AUTO: 29.2 PG (ref 27–31)
MCHC RBC AUTO-ENTMCNC: 31.7 G/DL (ref 33–36)
MCV RBC AUTO: 92 FL (ref 80–94)
MICROALBUMIN UR-MCNC: 191.5 UG/ML
MONOCYTES # BLD AUTO: 0.6 X10(3)/MCL (ref 0.1–1.3)
MONOCYTES NFR BLD AUTO: 9.4 %
MUCOUS THREADS URNS QL MICRO: ABNORMAL /LPF
NEUTROPHILS # BLD AUTO: 3.19 X10(3)/MCL (ref 2.1–9.2)
NEUTROPHILS NFR BLD AUTO: 50.4 %
NITRITE UR QL STRIP: NEGATIVE
NRBC BLD AUTO-RTO: 0 %
PH UR STRIP: 5 [PH]
PLATELET # BLD AUTO: 289 X10(3)/MCL (ref 130–400)
PMV BLD AUTO: 9.2 FL (ref 7.4–10.4)
POTASSIUM SERPL-SCNC: 3.8 MMOL/L (ref 3.5–5.1)
PROT SERPL-MCNC: 7.6 GM/DL (ref 5.8–7.6)
PROT UR QL STRIP: ABNORMAL
RBC # BLD AUTO: 4.52 X10(6)/MCL (ref 4.2–5.4)
RBC #/AREA URNS AUTO: ABNORMAL /HPF
SODIUM SERPL-SCNC: 142 MMOL/L (ref 136–145)
SP GR UR STRIP.AUTO: 1.03 (ref 1–1.03)
SQUAMOUS #/AREA URNS LPF: ABNORMAL /HPF
TRIGL SERPL-MCNC: 151 MG/DL (ref 37–140)
TSH SERPL-ACNC: 1.44 UIU/ML (ref 0.35–4.94)
UROBILINOGEN UR STRIP-ACNC: ABNORMAL
VLDLC SERPL CALC-MCNC: 30 MG/DL
WBC # BLD AUTO: 6.35 X10(3)/MCL (ref 4.5–11.5)
WBC #/AREA URNS AUTO: ABNORMAL /HPF

## 2025-06-26 PROCEDURE — 3044F HG A1C LEVEL LT 7.0%: CPT | Mod: CPTII,,, | Performed by: NURSE PRACTITIONER

## 2025-06-26 PROCEDURE — 1159F MED LIST DOCD IN RCRD: CPT | Mod: CPTII,,, | Performed by: NURSE PRACTITIONER

## 2025-06-26 PROCEDURE — 81001 URINALYSIS AUTO W/SCOPE: CPT

## 2025-06-26 PROCEDURE — 84443 ASSAY THYROID STIM HORMONE: CPT

## 2025-06-26 PROCEDURE — 80053 COMPREHEN METABOLIC PANEL: CPT

## 2025-06-26 PROCEDURE — 83036 HEMOGLOBIN GLYCOSYLATED A1C: CPT

## 2025-06-26 PROCEDURE — 99214 OFFICE O/P EST MOD 30 MIN: CPT | Mod: S$PBB,,, | Performed by: NURSE PRACTITIONER

## 2025-06-26 PROCEDURE — 3288F FALL RISK ASSESSMENT DOCD: CPT | Mod: CPTII,,, | Performed by: NURSE PRACTITIONER

## 2025-06-26 PROCEDURE — 82570 ASSAY OF URINE CREATININE: CPT

## 2025-06-26 PROCEDURE — 82306 VITAMIN D 25 HYDROXY: CPT

## 2025-06-26 PROCEDURE — 3008F BODY MASS INDEX DOCD: CPT | Mod: CPTII,,, | Performed by: NURSE PRACTITIONER

## 2025-06-26 PROCEDURE — 1160F RVW MEDS BY RX/DR IN RCRD: CPT | Mod: CPTII,,, | Performed by: NURSE PRACTITIONER

## 2025-06-26 PROCEDURE — 85025 COMPLETE CBC W/AUTO DIFF WBC: CPT

## 2025-06-26 PROCEDURE — 80061 LIPID PANEL: CPT

## 2025-06-26 PROCEDURE — 4010F ACE/ARB THERAPY RXD/TAKEN: CPT | Mod: CPTII,,, | Performed by: NURSE PRACTITIONER

## 2025-06-26 PROCEDURE — 1126F AMNT PAIN NOTED NONE PRSNT: CPT | Mod: CPTII,,, | Performed by: NURSE PRACTITIONER

## 2025-06-26 PROCEDURE — 99215 OFFICE O/P EST HI 40 MIN: CPT | Mod: PBBFAC | Performed by: NURSE PRACTITIONER

## 2025-06-26 PROCEDURE — 1101F PT FALLS ASSESS-DOCD LE1/YR: CPT | Mod: CPTII,,, | Performed by: NURSE PRACTITIONER

## 2025-06-26 PROCEDURE — 36415 COLL VENOUS BLD VENIPUNCTURE: CPT

## 2025-06-26 PROCEDURE — 3060F POS MICROALBUMINURIA REV: CPT | Mod: CPTII,,, | Performed by: NURSE PRACTITIONER

## 2025-06-26 PROCEDURE — 3079F DIAST BP 80-89 MM HG: CPT | Mod: CPTII,,, | Performed by: NURSE PRACTITIONER

## 2025-06-26 PROCEDURE — 3075F SYST BP GE 130 - 139MM HG: CPT | Mod: CPTII,,, | Performed by: NURSE PRACTITIONER

## 2025-06-26 PROCEDURE — 3066F NEPHROPATHY DOC TX: CPT | Mod: CPTII,,, | Performed by: NURSE PRACTITIONER

## 2025-06-26 RX ORDER — PREDNISONE 50 MG/1
TABLET ORAL
Qty: 3 TABLET | Refills: 0 | Status: SHIPPED | OUTPATIENT
Start: 2025-06-26

## 2025-06-26 RX ORDER — DIPHENHYDRAMINE HCL 50 MG
CAPSULE ORAL
Qty: 1 CAPSULE | Refills: 0 | Status: SHIPPED | OUTPATIENT
Start: 2025-06-26

## 2025-06-26 RX ORDER — FAMOTIDINE 20 MG/1
TABLET, FILM COATED ORAL
Qty: 2 TABLET | Refills: 0 | Status: SHIPPED | OUTPATIENT
Start: 2025-06-26

## 2025-06-26 NOTE — ASSESSMENT & PLAN NOTE
Pt denies any change in appetite/ activity level  History of abnormal stool test, referred for scope and previously refused  Due for mammogram, last 2/2024 and did not complete MRI - reports left breast skin changes; ordering diagnostic mmg and US  CT c/a/p

## 2025-06-26 NOTE — PROGRESS NOTES
Sharon L Miguel, NP   [facility]  [address] [facility] [address]      PATIENT NAME: Rabia Valencia  : 1955  DATE: 25  MRN: [id number]        History of Present Illness / Problem Focused Workflow     Rabia Valencia presents to the clinic with Follow-up and Labs Only     68 yo female for follow up/ lab review. PMH migraine HA, abnormal Cologuard. Active diagnosis includes HTN, HLD, prediabetes, vitamin D deficiency, constipation, OA, obesity.   ER visit 25, + COVID. Feeling better. Took OTC Nyquil with relief.     /87. Labs today. Microalbuminuria elevated. A1c 6%. Otherwise, stable.  Wt loss 3# since last visit. Total weight loss 8# since May last year. She denies any changes with dietary habits/ physical activity. Denies any fever, chills, night sweats, HA, dizziness, CP, cough, SOB, abdominal pain, poor appetite, n/v/d, hematochezia, hematuria, brbpr or vaginal bleeding. Does mention left breast pain intermittently and noticed pink color to skin that she does not have on other breast. Family history of breast cancer. Last MMG 2024. Never completed MRI.     Screenings  MMG - due for MRI   DEXA - due; ordered   CRC - Cologuard abnormal , did not complete colonoscopy? Referred ordered again today      Other providers   [facility] GYN     Review of Systems     Review of Systems   Constitutional:  Positive for unexpected weight change.        Breast color change   HENT: Negative.     Eyes: Negative.    Respiratory: Negative.     Cardiovascular: Negative.    Gastrointestinal: Negative.    Endocrine: Negative.    Genitourinary: Negative.    Musculoskeletal: Negative.    Skin: Negative.    Allergic/Immunologic: Negative.    Neurological: Negative.    Hematological: Negative.    Psychiatric/Behavioral: Negative.         Medical / Social / Family History     -------------------------------------    Hyperlipidemia    Hypertension  "       Past Surgical History:   Procedure Laterality Date    BREAST SURGERY         Social History[1]     Family History   Problem Relation Name Age of Onset    Hypertension Mother      Cancer Father      Breast cancer Sister      Cancer Sister          Medications and Allergies     Medications  Current Outpatient Medications   Medication Instructions    cholecalciferol (vitamin D3) (VITAMIN D3) 2,000 Units, Oral, Daily    diphenhydrAMINE (BENADRYL) 50 MG capsule Take 50mg by mouth 1 hour before contrast administration.    famotidine (PEPCID) 20 MG tablet Take 1 tablet by mouth with first and third dose of prednisone.    fluticasone propionate (FLONASE) 50 mcg, Each Nostril, 2 times daily PRN    losartan (COZAAR) 100 mg, Oral, Daily    meloxicam (MOBIC) 15 mg, Oral, Daily PRN, Don't take with ibuprofen, motrin, aleve, naprosyn or any other NSAID.    metoprolol succinate (TOPROL-XL) 25 mg, Oral, Daily    predniSONE (DELTASONE) 50 MG Tab Take 50mg by mouth at 13 hours, 7 hours, and 1 hour before contrast administration.    simvastatin (ZOCOR) 20 mg, Oral, Nightly       Allergies  Review of patient's allergies indicates:   Allergen Reactions    Iodine     Morphine      HIVES    Opioids - morphine analogues      Pt informed post surgery by MD that she is allergic to morphine    Iodine and iodide containing products Rash       Physical Examination     Visit Vitals  /87 (BP Location: Right arm, Patient Position: Sitting)   Pulse 62   Temp 98.3 °F (36.8 °C) (Oral)   Resp 16   Ht 4' 11" (1.499 m)   Wt 94.9 kg (209 lb 3.2 oz)   SpO2 97%   BMI 42.25 kg/m²       Physical Exam  Constitutional:       General: She is not in acute distress.     Appearance: Normal appearance. She is obese. She is not ill-appearing or diaphoretic.   HENT:      Head: Normocephalic.   Cardiovascular:      Rate and Rhythm: Normal rate and regular rhythm.      Heart sounds: No murmur heard.  Pulmonary:      Effort: Pulmonary effort is normal. No " respiratory distress.      Breath sounds: Normal breath sounds. No stridor. No wheezing, rhonchi or rales.   Chest:   Breasts:     Left: Skin change present.   Skin:     General: Skin is warm and dry.   Neurological:      Mental Status: She is alert and oriented to person, place, and time. Mental status is at baseline.      Coordination: Coordination normal.      Gait: Gait normal.   Psychiatric:         Mood and Affect: Mood normal.         Behavior: Behavior normal.         Thought Content: Thought content normal.         Judgment: Judgment normal.           Results     Lab Results   Component Value Date    WBC 6.35 06/26/2025    RBC 4.52 06/26/2025    HGB 13.2 06/26/2025    HCT 41.6 06/26/2025    MCV 92.0 06/26/2025    MCH 29.2 06/26/2025    MCHC 31.7 (L) 06/26/2025    RDW 13.2 06/26/2025     06/26/2025    MPV 9.2 06/26/2025     Sodium   Date Value Ref Range Status   06/26/2025 142 136 - 145 mmol/L Final     Potassium   Date Value Ref Range Status   06/26/2025 3.8 3.5 - 5.1 mmol/L Final     Chloride   Date Value Ref Range Status   06/26/2025 108 (H) 98 - 107 mmol/L Final     CO2   Date Value Ref Range Status   06/26/2025 26 23 - 31 mmol/L Final     Glucose   Date Value Ref Range Status   06/26/2025 116 (H) 82 - 115 mg/dL Final     Blood Urea Nitrogen   Date Value Ref Range Status   06/26/2025 15.7 9.8 - 20.1 mg/dL Final     Creatinine   Date Value Ref Range Status   06/26/2025 0.87 0.55 - 1.02 mg/dL Final     Calcium   Date Value Ref Range Status   06/26/2025 9.4 8.4 - 10.2 mg/dL Final     Protein Total   Date Value Ref Range Status   06/26/2025 7.6 5.8 - 7.6 gm/dL Final     Albumin   Date Value Ref Range Status   06/26/2025 3.6 3.4 - 4.8 g/dL Final     Bilirubin Total   Date Value Ref Range Status   06/26/2025 0.5 <=1.5 mg/dL Final     ALP   Date Value Ref Range Status   06/26/2025 90 40 - 150 unit/L Final     AST   Date Value Ref Range Status   06/26/2025 17 11 - 45 unit/L Final     ALT   Date Value Ref  Range Status   06/26/2025 13 0 - 55 unit/L Final     Estimated GFR-Non    Date Value Ref Range Status   05/02/2022 >60 mls/min/1.73/m2 Final     Lab Results   Component Value Date    CHOL 177 06/26/2025     Lab Results   Component Value Date    HDL 41 06/26/2025     Lab Results   Component Value Date    TRIG 151 (H) 06/26/2025     Lab Results   Component Value Date    .00 06/26/2025     Lab Results   Component Value Date    TSH 1.441 06/26/2025     Lab Results   Component Value Date    PHUR 5.0 06/26/2025    PROTEINUA 1+ (A) 06/26/2025    GLUCUA Normal 06/26/2025    KETONESU Negative 09/23/2021    OCCULTUA Negative 06/26/2025    NITRITE Negative 06/26/2025    LEUKOCYTESUR 25 (A) 06/26/2025     Lab Results   Component Value Date    HGBA1C 6.0 06/26/2025    HGBA1C 5.7 10/18/2021    HGBA1C 5.9 (H) 05/17/2017     Lab Results   Component Value Date    MICALBCREAT 78.1 (H) 06/26/2025        Assessment       ICD-10-CM ICD-9-CM   1. Weight loss, unintentional  R63.4 783.21   2. Benign hypertension  I10 401.1   3. Visit for screening mammogram  Z12.31 V76.12   4. Mixed hyperlipidemia  E78.2 272.2   5. Obesity (BMI 35.0-39.9 without comorbidity)  E66.9 278.00   6. Prediabetes  R73.03 790.29   7. COVID  U07.1 079.89   8. Breast pain, left  N64.4 611.71   9. Postmenopause  Z78.0 V49.81   10. Class 3 severe obesity due to excess calories with serious comorbidity and body mass index (BMI) of 40.0 to 44.9 in adult  E66.813 278.01    E66.01 V85.41    Z68.41    11. IGT (impaired glucose tolerance)  R73.02 790.22   12. Vitamin D deficiency  E55.9 268.9   13. Abnormal stool test  R19.5 792.1       Plan       Problem List Items Addressed This Visit          Cardiac/Vascular    Benign hypertension (Chronic)    Current Assessment & Plan   BP Readings from Last 3 Encounters:   06/26/25 138/87   06/19/25 (!) 159/88   04/08/25 (!) 142/78     Follow low sodium diet, < 2 gm/day (avoid high salty foods such as processed  meats/ sausage/wood/ sandwich meat, chips, pickles, cheese, crackers and soft drinks/ electrolyte replacement drinks).  Avoid tobacco/ alcohol use  Educated on health benefits of at least 5 days/ week of 30 minutes moderate intensity exercise (brisk walking) and 2 or more days/ week of muscle strength activities  Daily ASA 81 mg for CV prevention  Continue current medication regimen           Hyperlipidemia (Chronic)    Current Assessment & Plan   Lab Results   Component Value Date    CHOL 177 06/26/2025     Lab Results   Component Value Date    HDL 41 06/26/2025     Lab Results   Component Value Date    TRIG 151 (H) 06/26/2025     Lab Results   Component Value Date    .00 06/26/2025     Avoid tobacco/ alcohol  Follow low fat/low cholesterol diet such as avoid/ decrease wood, sausage, fried foods, cookies, cakes, chips, cheese, whole milk, butter, mayonnaise. Add olive oil, avocados, lean meats, fresh fruits/ vegetables, heart healthy nuts to diet.  Educated on health benefits of exercise 5 days/ week of at least 30 minutes moderate intensity exercise (brisk walking) and 2 days/ week of muscle strength activities  Daily ASA 81 mg for CV prevention  Continue current medication regimen                Renal/    Breast pain, left    Current Assessment & Plan   She reports skin change and some tenderness to left breast   Last MMG 2024, needed MRI however pt never completed  Digital diagnostic with US ordered for further eval         Relevant Orders    Mammo Digital Diagnostic Bilat    US Breast Left Limited       Endocrine    Vitamin D deficiency (Chronic)    Current Assessment & Plan   Lab Results   Component Value Date    [id number] 31 06/26/2025     Educated on increasing foods high in Vitamin D such as mushrooms, fish oil, cod liver, salmon, tuna, egg yolks, milk fortified with Vitamin D and foods fortified with Vitamin D such as cereals and oatmeal.  Daily supplementation of Vitamin D 2000 IU daily in  addition to Calcium supplementation 1000 mg- 1200 mg daily.           Class 3 severe obesity with serious comorbidity and body mass index (BMI) of 40.0 to 44.9 in adult    Current Assessment & Plan   BMI 42.25  Educated on increased risk of disease s/t obesity.  Educated on health benefits of at least 5 days/ week of 30 minutes moderate intensity exercise (brisk walking) and 2 or more days/ week of muscle strength activities (as tolerated).  Eat well balanced diet of fresh fruits/ vegetables, whole grains, lean meats and limit high carbohydrate foods.            Obesity (BMI 35.0-39.9 without comorbidity)    Prediabetes    Current Assessment & Plan   Lab Results   Component Value Date    HGBA1C 6.0 06/26/2025     Continue to monitor, follow ADA diet         Weight loss, unintentional - Primary    Current Assessment & Plan   Pt denies any change in appetite/ activity level  History of abnormal stool test, referred for scope and previously refused  Due for mammogram, last 2/2024 and did not complete MRI - reports left breast skin changes; ordering diagnostic mmg and US  CT c/a/p         Relevant Medications    predniSONE (DELTASONE) 50 MG Tab    diphenhydrAMINE (BENADRYL) 50 MG capsule    famotidine (PEPCID) 20 MG tablet    Other Relevant Orders    CT Chest Abdomen Pelvis W W/O Contrast (XPD)       GI    Abnormal stool test    Current Assessment & Plan   Lab Results   Component Value Date    COLOGUARD Positive (A) 09/19/2022     Was referred to [facility] GI for colonoscopy and do not see this has been completed  Referred to [facility] GI lab for colonoscopy   Wt loss noted, recommend scope          Other Visit Diagnoses         Visit for screening mammogram      Due; see left breast    Relevant Orders    Mammo Digital Diagnostic Bilat      COVID      Pt seen in ER, + 6/19/25. Symptoms improved. Took OTC medication. Afebrile.      Postmenopause      Due for DXA    Relevant Orders    DXA Bone Density Axial Skeleton 1 or more sites       IGT (impaired glucose tolerance)      See prediabetes            Future Appointments   Date Time Provider Department Center   7/9/2025  9:30 AM [facility] XR10 DEXA1 350 LB LIMIT [facility] XRAY [place]   7/9/2025 10:00 AM [facility] CT1 450 LB LIMIT [facility] CTSCN [place]   8/26/2025  9:00 AM Sharon Rivera NP [facility] INTMED [place]   9/3/2025 10:30 AM Diana Decker, ANP [facility] GYN [place]        Follow up in about 2 months (around 8/26/2025) for wt follow up .    Signature:  Sharon Rivera NP  [facility]  [address] [facility] [address]    Date of encounter: 6/26/25         [1]   Social History  Socioeconomic History    Marital status: Single    Number of children: 2   Tobacco Use    Smoking status: Never    Smokeless tobacco: Never   Substance and Sexual Activity    Alcohol use: Never    Drug use: Never    Sexual activity: Not Currently     Partners: Male     Social Drivers of Health     Financial Resource Strain: Low Risk  (4/6/2023)    Overall Financial Resource Strain (CARDIA)     Difficulty of Paying Living Expenses: Not hard at all   Food Insecurity: No Food Insecurity (4/6/2023)    Hunger Vital Sign     Worried About Running Out of Food in the Last Year: Never true     Ran Out of Food in the Last Year: Never true   Transportation Needs: No Transportation Needs (4/6/2023)    PRAPARE - Transportation     Lack of Transportation (Medical): No     Lack of Transportation (Non-Medical): No   Physical Activity: Inactive (4/6/2023)    Exercise Vital Sign     Days of Exercise per Week: 0 days     Minutes of Exercise per Session: 0 min   Stress: No Stress Concern Present (4/6/2023)    [employer] - Occupational Stress Questionnaire     Feeling of Stress : Only a little   Housing Stability: Low Risk  (4/6/2023)    Housing Stability Vital Sign     Unable to Pay for Housing in the Last Year: No     Number of Places Lived  in the Last Year: 1     Unstable Housing in the Last Year: No

## 2025-06-30 ENCOUNTER — TELEPHONE (OUTPATIENT)
Dept: INTERNAL MEDICINE | Facility: CLINIC | Age: 70
End: 2025-06-30
Payer: MEDICARE

## 2025-06-30 NOTE — TELEPHONE ENCOUNTER
Please inform patient noted previous abnormal stool test and referrals were ordered to GI for colonoscopy to be completed for further evaluation. Recommend completing as could be cause of her weight loss.    Please inform patient of the following due to contrast dye listed as allergy. Patient will need to be premedicated to complete scan.     Directions as followed:   Day prior to scheduled contrast media, take:   1. Prednisone 50 mg PO every 6 hours x 3 doses, beginning at 6 PM the night before procedure  2. Pepcid (famotidine) 20 mg PO with the first and third dose of Prednisone.   3. Benadryl 50 mg PO morning of the procedure with the last doses of Prednisone and famotidine.     Prescriptions sent to pharmacy however, Pepcid and Benadryl are OTC medications.     Thank you

## 2025-06-30 NOTE — TELEPHONE ENCOUNTER
----- Message from Monet sent at 6/26/2025  1:14 PM CDT -----  Regarding: Pre-Meds for Iodine Allergy  Pt is scheduled for her CT on  07/09/25 at [facility]. Pt will need pre-meds due to iodine allergies.

## 2025-07-02 NOTE — TELEPHONE ENCOUNTER
I attempted to speak to Pt. About colonoscopy and medications ordered. No answer. Unable to leave voice message.

## 2025-07-03 PROBLEM — E66.813 CLASS 3 SEVERE OBESITY WITH SERIOUS COMORBIDITY AND BODY MASS INDEX (BMI) OF 40.0 TO 44.9 IN ADULT: Status: ACTIVE | Noted: 2022-08-22

## 2025-07-03 PROBLEM — R73.03 PREDIABETES: Status: ACTIVE | Noted: 2025-04-08

## 2025-07-03 NOTE — ASSESSMENT & PLAN NOTE
Lab Results   Component Value Date    COLOGUARD Positive (A) 09/19/2022     Was referred to [facility] for colonoscopy and do not see this has been completed  Referred to [facility] GI lab for colonoscopy   Wt loss noted, recommend scope

## 2025-07-03 NOTE — ASSESSMENT & PLAN NOTE
Lab Results   Component Value Date    HGBA1C 6.0 06/26/2025     Continue to monitor, follow ADA diet

## 2025-07-03 NOTE — ASSESSMENT & PLAN NOTE
She reports skin change and some tenderness to left breast   Last MMG 2024, needed MRI however pt never completed  Digital diagnostic with US ordered for further eval

## 2025-07-03 NOTE — ASSESSMENT & PLAN NOTE
Lab Results   Component Value Date    [id number] 31 06/26/2025     Educated on increasing foods high in Vitamin D such as mushrooms, fish oil, cod liver, salmon, tuna, egg yolks, milk fortified with Vitamin D and foods fortified with Vitamin D such as cereals and oatmeal.  Daily supplementation of Vitamin D 2000 IU daily in addition to Calcium supplementation 1000 mg- 1200 mg daily.

## 2025-07-03 NOTE — ASSESSMENT & PLAN NOTE
BMI 42.25  Educated on increased risk of disease s/t obesity.  Educated on health benefits of at least 5 days/ week of 30 minutes moderate intensity exercise (brisk walking) and 2 or more days/ week of muscle strength activities (as tolerated).  Eat well balanced diet of fresh fruits/ vegetables, whole grains, lean meats and limit high carbohydrate foods.

## 2025-07-03 NOTE — ASSESSMENT & PLAN NOTE
Lab Results   Component Value Date    CHOL 177 06/26/2025     Lab Results   Component Value Date    HDL 41 06/26/2025     Lab Results   Component Value Date    TRIG 151 (H) 06/26/2025     Lab Results   Component Value Date    .00 06/26/2025     Avoid tobacco/ alcohol  Follow low fat/low cholesterol diet such as avoid/ decrease owod, sausage, fried foods, cookies, cakes, chips, cheese, whole milk, butter, mayonnaise. Add olive oil, avocados, lean meats, fresh fruits/ vegetables, heart healthy nuts to diet.  Educated on health benefits of exercise 5 days/ week of at least 30 minutes moderate intensity exercise (brisk walking) and 2 days/ week of muscle strength activities  Daily ASA 81 mg for CV prevention  Continue current medication regimen       3

## 2025-07-03 NOTE — TELEPHONE ENCOUNTER
I spoke to Pt. And recommended she have a Colonoscopy and reasons why. Pt. Stated, she does want to have it done. I explained to Pt. The importance of detection of certain conditions.Pt. verbalized understanding. I also informed Pt, of medications ordered for her CT d/t iodine allergy. Pt. Verbalized understanding. I let her know she can call the clinic back before the procedure if she has any questions.

## 2025-07-03 NOTE — ASSESSMENT & PLAN NOTE
BP Readings from Last 3 Encounters:   06/26/25 138/87   06/19/25 (!) 159/88   04/08/25 (!) 142/78     Follow low sodium diet, < 2 gm/day (avoid high salty foods such as processed meats/ sausage/wood/ sandwich meat, chips, pickles, cheese, crackers and soft drinks/ electrolyte replacement drinks).  Avoid tobacco/ alcohol use  Educated on health benefits of at least 5 days/ week of 30 minutes moderate intensity exercise (brisk walking) and 2 or more days/ week of muscle strength activities  Daily ASA 81 mg for CV prevention  Continue current medication regimen

## 2025-07-09 ENCOUNTER — RESULTS FOLLOW-UP (OUTPATIENT)
Dept: INTERNAL MEDICINE | Facility: CLINIC | Age: 70
End: 2025-07-09
Payer: MEDICARE

## 2025-07-09 ENCOUNTER — HOSPITAL ENCOUNTER (OUTPATIENT)
Dept: RADIOLOGY | Facility: HOSPITAL | Age: 70
Discharge: HOME | End: 2025-07-09
Attending: NURSE PRACTITIONER
Payer: MEDICARE

## 2025-07-09 DIAGNOSIS — Z78.0 POSTMENOPAUSE: ICD-10-CM

## 2025-07-09 DIAGNOSIS — R63.4 WEIGHT LOSS, UNINTENTIONAL: ICD-10-CM

## 2025-07-09 DIAGNOSIS — R19.5 ABNORMAL STOOL TEST: Primary | ICD-10-CM

## 2025-07-09 PROCEDURE — 77080 DXA BONE DENSITY AXIAL: CPT | Mod: TC

## 2025-07-09 PROCEDURE — 25500020 PHARM REV CODE 255: Performed by: NURSE PRACTITIONER

## 2025-07-09 PROCEDURE — 71270 CT THORAX DX C-/C+: CPT | Mod: TC

## 2025-07-09 RX ORDER — DIATRIZOATE MEGLUMINE AND DIATRIZOATE SODIUM 660; 100 MG/ML; MG/ML
SOLUTION ORAL; RECTAL
Status: DISPENSED
Start: 2025-07-09 | End: 2025-07-09

## 2025-07-09 RX ADMIN — IOHEXOL 100 ML: 350 INJECTION, SOLUTION INTRAVENOUS at 11:07

## 2025-07-09 NOTE — PROGRESS NOTES
Iodine Allergy: Per patient was premedicated with prednisone 50 mg at 13 hours, 7 hours and 1 hour prior to sc Benadryl 50 mg at 1 hour before.       Pt refused oral contrast.

## 2025-07-09 NOTE — PROGRESS NOTES
Please inform patient of results:  -Bone density normal  -CT c/a/p without abnormal findings to explain weight loss.   Encourage scheduling colonoscopy due to history of abnormal stool test and need for further testing.

## 2025-07-10 ENCOUNTER — TELEPHONE (OUTPATIENT)
Dept: INTERNAL MEDICINE | Facility: CLINIC | Age: 70
End: 2025-07-10
Payer: MEDICARE

## 2025-07-10 NOTE — TELEPHONE ENCOUNTER
Called pt and relayed PCP's message below. Pt verbalized understanding and stated the medication given to her to prep for colonoscopy she has trouble taking and has transportation issues since her daughter is out of state until the beginning of August.

## 2025-07-10 NOTE — TELEPHONE ENCOUNTER
----- Message from Sharon Rivera NP sent at 7/9/2025  1:04 PM CDT -----  Please inform patient of results:  -Bone density normal  -CT c/a/p without abnormal findings to explain weight loss.   Encourage scheduling colonoscopy due to history of abnormal stool test and need for further testing.  ----- Message -----  From: Bijan, Rad Results In  Sent: 7/9/2025  11:23 AM CDT  To: Sharon Rivera NP

## 2025-07-11 ENCOUNTER — TELEPHONE (OUTPATIENT)
Dept: INTERNAL MEDICINE | Facility: CLINIC | Age: 70
End: 2025-07-11
Payer: MEDICARE

## 2025-07-11 NOTE — TELEPHONE ENCOUNTER
Copied from CRM #[id number]. Topic: Medications - Medication Question  >> Jul 7, 2025  2:46 PM Katie wrote:  Who Called: Rabia Valencia    Caller is requesting assistance/information from provider's office.    Symptoms (please be specific):     How long has patient had these symptoms:     List of preferred pharmacies on file (remove unneeded): [unfilled]  If different, enter pharmacy into here including location and phone number:        Preferred Method of Contact: Phone Call  Patient's Preferred Phone Number on File: [phone]   Best Call Back Number, if different:  Additional Information:   predniSONE (DELTASONE) 50 MG Tab pt would like to speak with nurse about how to take med  & famotidine (PEPCID) 20 MG tablet

## 2025-07-11 NOTE — TELEPHONE ENCOUNTER
Called and spoke to pt . Informed her of PCP 's message below. Pt voiced understanding.       Sharon Rivera, NP  [facility]    7/10/25 12:30 PM  Note  Encourage her to f/u with GI regarding concerns please.

## 2025-09-03 ENCOUNTER — OFFICE VISIT (OUTPATIENT)
Dept: GYNECOLOGY | Facility: CLINIC | Age: 70
End: 2025-09-03
Payer: MEDICARE

## 2025-09-03 VITALS
SYSTOLIC BLOOD PRESSURE: 137 MMHG | HEIGHT: 59 IN | RESPIRATION RATE: 20 BRPM | TEMPERATURE: 99 F | WEIGHT: 163.19 LBS | HEART RATE: 78 BPM | DIASTOLIC BLOOD PRESSURE: 87 MMHG | BODY MASS INDEX: 32.9 KG/M2 | OXYGEN SATURATION: 100 %

## 2025-09-03 DIAGNOSIS — K46.9 ENTEROCELE: ICD-10-CM

## 2025-09-03 DIAGNOSIS — Z01.419 ENCOUNTER FOR ANNUAL ROUTINE GYNECOLOGICAL EXAMINATION: Primary | ICD-10-CM

## 2025-09-03 PROCEDURE — G0101 CA SCREEN;PELVIC/BREAST EXAM: HCPCS | Mod: S$PBB,,, | Performed by: NURSE PRACTITIONER

## 2025-09-03 PROCEDURE — 3060F POS MICROALBUMINURIA REV: CPT | Mod: CPTII,,, | Performed by: NURSE PRACTITIONER

## 2025-09-03 PROCEDURE — 3079F DIAST BP 80-89 MM HG: CPT | Mod: CPTII,,, | Performed by: NURSE PRACTITIONER

## 2025-09-03 PROCEDURE — 3288F FALL RISK ASSESSMENT DOCD: CPT | Mod: CPTII,,, | Performed by: NURSE PRACTITIONER

## 2025-09-03 PROCEDURE — 3044F HG A1C LEVEL LT 7.0%: CPT | Mod: CPTII,,, | Performed by: NURSE PRACTITIONER

## 2025-09-03 PROCEDURE — 4010F ACE/ARB THERAPY RXD/TAKEN: CPT | Mod: CPTII,,, | Performed by: NURSE PRACTITIONER

## 2025-09-03 PROCEDURE — 1159F MED LIST DOCD IN RCRD: CPT | Mod: CPTII,,, | Performed by: NURSE PRACTITIONER

## 2025-09-03 PROCEDURE — G0101 CA SCREEN;PELVIC/BREAST EXAM: HCPCS | Mod: PBBFAC | Performed by: NURSE PRACTITIONER

## 2025-09-03 PROCEDURE — 1101F PT FALLS ASSESS-DOCD LE1/YR: CPT | Mod: CPTII,,, | Performed by: NURSE PRACTITIONER

## 2025-09-03 PROCEDURE — 3075F SYST BP GE 130 - 139MM HG: CPT | Mod: CPTII,,, | Performed by: NURSE PRACTITIONER

## 2025-09-03 PROCEDURE — 99214 OFFICE O/P EST MOD 30 MIN: CPT | Mod: PBBFAC | Performed by: NURSE PRACTITIONER

## 2025-09-03 PROCEDURE — 3066F NEPHROPATHY DOC TX: CPT | Mod: CPTII,,, | Performed by: NURSE PRACTITIONER

## 2025-09-03 RX ORDER — ATORVASTATIN CALCIUM 10 MG/1
10 TABLET, FILM COATED ORAL DAILY
COMMUNITY
End: 2025-09-03